# Patient Record
Sex: FEMALE | Race: WHITE | NOT HISPANIC OR LATINO | Employment: FULL TIME | URBAN - METROPOLITAN AREA
[De-identification: names, ages, dates, MRNs, and addresses within clinical notes are randomized per-mention and may not be internally consistent; named-entity substitution may affect disease eponyms.]

---

## 2018-10-02 ENCOUNTER — HOSPITAL ENCOUNTER (EMERGENCY)
Facility: HOSPITAL | Age: 28
Discharge: HOME | End: 2018-10-02
Attending: EMERGENCY MEDICINE
Payer: MEDICAID

## 2018-10-02 VITALS — DIASTOLIC BLOOD PRESSURE: 53 MMHG | OXYGEN SATURATION: 99 % | HEART RATE: 68 BPM | SYSTOLIC BLOOD PRESSURE: 96 MMHG

## 2018-10-02 DIAGNOSIS — R56.9 SEIZURE: ICD-10-CM

## 2018-10-02 LAB
ALBUMIN SERPL BCP-MCNC: 4.4 G/DL
ALP SERPL-CCNC: 135 U/L
ALT SERPL W/O P-5'-P-CCNC: 274 U/L
ANION GAP SERPL CALC-SCNC: 23 MMOL/L
ANION GAP SERPL CALC-SCNC: 8 MMOL/L
AST SERPL-CCNC: 67 U/L
BASOPHILS # BLD AUTO: 0.1 K/UL
BASOPHILS NFR BLD: 0.9 %
BILIRUB SERPL-MCNC: 0.3 MG/DL
BUN SERPL-MCNC: 9 MG/DL
BUN SERPL-MCNC: 9 MG/DL
CALCIUM SERPL-MCNC: 8.1 MG/DL
CALCIUM SERPL-MCNC: 9.4 MG/DL
CHLORIDE SERPL-SCNC: 110 MMOL/L
CHLORIDE SERPL-SCNC: 116 MMOL/L
CO2 SERPL-SCNC: 19 MMOL/L
CO2 SERPL-SCNC: 9 MMOL/L
CREAT SERPL-MCNC: 0.8 MG/DL
CREAT SERPL-MCNC: 0.9 MG/DL
DIFFERENTIAL METHOD: ABNORMAL
EOSINOPHIL # BLD AUTO: 0.3 K/UL
EOSINOPHIL NFR BLD: 2.1 %
ERYTHROCYTE [DISTWIDTH] IN BLOOD BY AUTOMATED COUNT: 12.7 %
EST. GFR  (AFRICAN AMERICAN): >60 ML/MIN/1.73 M^2
EST. GFR  (AFRICAN AMERICAN): >60 ML/MIN/1.73 M^2
EST. GFR  (NON AFRICAN AMERICAN): >60 ML/MIN/1.73 M^2
EST. GFR  (NON AFRICAN AMERICAN): >60 ML/MIN/1.73 M^2
GLUCOSE SERPL-MCNC: 116 MG/DL
GLUCOSE SERPL-MCNC: 88 MG/DL
HCT VFR BLD AUTO: 46.3 %
HGB BLD-MCNC: 15.4 G/DL
LACTATE SERPL-SCNC: 0.8 MMOL/L
LACTATE SERPL-SCNC: 4 MMOL/L
LYMPHOCYTES # BLD AUTO: 5.5 K/UL
LYMPHOCYTES NFR BLD: 40.7 %
MCH RBC QN AUTO: 32.1 PG
MCHC RBC AUTO-ENTMCNC: 33.3 G/DL
MCV RBC AUTO: 97 FL
MONOCYTES # BLD AUTO: 0.9 K/UL
MONOCYTES NFR BLD: 6.9 %
NEUTROPHILS # BLD AUTO: 6.7 K/UL
NEUTROPHILS NFR BLD: 49.4 %
PLATELET # BLD AUTO: 325 K/UL
PMV BLD AUTO: 8.5 FL
POTASSIUM SERPL-SCNC: 3.3 MMOL/L
POTASSIUM SERPL-SCNC: 4 MMOL/L
PROT SERPL-MCNC: 8.1 G/DL
RBC # BLD AUTO: 4.79 M/UL
SODIUM SERPL-SCNC: 142 MMOL/L
SODIUM SERPL-SCNC: 143 MMOL/L
WBC # BLD AUTO: 13.5 K/UL

## 2018-10-02 PROCEDURE — 80053 COMPREHEN METABOLIC PANEL: CPT

## 2018-10-02 PROCEDURE — 83605 ASSAY OF LACTIC ACID: CPT

## 2018-10-02 PROCEDURE — 80048 BASIC METABOLIC PNL TOTAL CA: CPT

## 2018-10-02 PROCEDURE — 36415 COLL VENOUS BLD VENIPUNCTURE: CPT

## 2018-10-02 PROCEDURE — 25000003 PHARM REV CODE 250: Performed by: EMERGENCY MEDICINE

## 2018-10-02 PROCEDURE — 96375 TX/PRO/DX INJ NEW DRUG ADDON: CPT

## 2018-10-02 PROCEDURE — 63600175 PHARM REV CODE 636 W HCPCS: Performed by: EMERGENCY MEDICINE

## 2018-10-02 PROCEDURE — 99284 EMERGENCY DEPT VISIT MOD MDM: CPT | Mod: 25

## 2018-10-02 PROCEDURE — 93010 ELECTROCARDIOGRAM REPORT: CPT | Mod: ,,, | Performed by: INTERNAL MEDICINE

## 2018-10-02 PROCEDURE — 85025 COMPLETE CBC W/AUTO DIFF WBC: CPT

## 2018-10-02 PROCEDURE — 96365 THER/PROPH/DIAG IV INF INIT: CPT

## 2018-10-02 PROCEDURE — 93005 ELECTROCARDIOGRAM TRACING: CPT

## 2018-10-02 PROCEDURE — 96361 HYDRATE IV INFUSION ADD-ON: CPT

## 2018-10-02 RX ORDER — LEVETIRACETAM 10 MG/ML
1000 INJECTION INTRAVASCULAR
Status: COMPLETED | OUTPATIENT
Start: 2018-10-02 | End: 2018-10-02

## 2018-10-02 RX ORDER — LORAZEPAM 2 MG/ML
1 INJECTION INTRAMUSCULAR
Status: COMPLETED | OUTPATIENT
Start: 2018-10-02 | End: 2018-10-02

## 2018-10-02 RX ORDER — LEVETIRACETAM 1000 MG/1
1500 TABLET ORAL 2 TIMES DAILY
Qty: 90 TABLET | Refills: 0 | Status: SHIPPED | OUTPATIENT
Start: 2018-10-02 | End: 2018-11-21 | Stop reason: SDUPTHER

## 2018-10-02 RX ADMIN — LORAZEPAM 1 MG: 2 INJECTION, SOLUTION INTRAMUSCULAR; INTRAVENOUS at 10:10

## 2018-10-02 RX ADMIN — SODIUM CHLORIDE 1000 ML: 0.9 INJECTION, SOLUTION INTRAVENOUS at 10:10

## 2018-10-02 RX ADMIN — SODIUM CHLORIDE 1000 ML: 0.9 INJECTION, SOLUTION INTRAVENOUS at 11:10

## 2018-10-02 RX ADMIN — LEVETIRACETAM 1000 MG: 10 INJECTION INTRAVENOUS at 10:10

## 2018-10-02 NOTE — ED NOTES
Awake/alert - no seizure activity noted this visit (initially post-ictal). Talking on cellular phone

## 2018-10-02 NOTE — ED PROVIDER NOTES
Encounter Date: 10/2/2018    SCRIBE #1 NOTE: IJanay, am scribing for, and in the presence of, Dr. Jensen.       History     Chief Complaint   Patient presents with    Seizures       Time seen by provider: 10:04 AM on 10/02/2018    Kiki Molina is a 28 y.o. female with epilepsy on Keppra 750 mg BID who presents to the ED via EMS with an onset of a witnessed seizure. Per EMS, the patient had a tonic clonic seizure en route to the ER and bit her tongue. The patient states that she ran out of her Keppra yesterday and reports drinking alcohol last night. She denies any other symptoms at this time. No SHx noted. Percocet and Lamictal drug allergies noted.      The history is provided by the patient and a friend. History limited by: Postictal.     Review of patient's allergies indicates:   Allergen Reactions    Percocet [oxycodone-acetaminophen] Rash    Lamictal [lamotrigine] Rash     Past Medical History:   Diagnosis Date    Seizures      History reviewed. No pertinent surgical history.  History reviewed. No pertinent family history.  Social History     Tobacco Use    Smoking status: Never Smoker   Substance Use Topics    Alcohol use: No    Drug use: No     Review of Systems   Unable to perform ROS: Other (Postictal following seizure)   HENT:        Positive for tongue abrasion.    Neurological: Positive for seizures.     Physical Exam     Initial Vitals [10/02/18 1102]   BP Pulse Resp Temp SpO2   (!) 96/54 102 -- -- 99 %      MAP       --         Physical Exam    Nursing note and vitals reviewed.  Constitutional: She appears well-developed and well-nourished. She is not diaphoretic. No distress.   Postictal.    HENT:   Head: Normocephalic and atraumatic.   Mouth/Throat: Lacerations present.   Tongue abrasion.    Eyes: EOM are normal.   Neck: Normal range of motion. Neck supple.   Cardiovascular: Regular rhythm and normal heart sounds. Tachycardia present.  Exam reveals no gallop and no friction rub.     No murmur heard.  Pulmonary/Chest: Breath sounds normal. No respiratory distress. She has no wheezes. She has no rhonchi. She has no rales.   Abdominal: Soft. She exhibits no distension. There is no tenderness. There is no rebound.   Musculoskeletal: Normal range of motion.   Neurological: She is alert and oriented to person, place, and time. She displays seizure activity.   Skin: Skin is warm and dry.   Psychiatric: She has a normal mood and affect. Her behavior is normal. Judgment and thought content normal.       ED Course   Procedures  Labs Reviewed   COMPREHENSIVE METABOLIC PANEL - Abnormal; Notable for the following components:       Result Value    Potassium 3.3 (*)     CO2 9 (*)     Glucose 116 (*)     AST 67 (*)      (*)     Anion Gap 23 (*)     All other components within normal limits    Narrative:     CO2 critical result(s) called and verbal readback obtained from Oscar Mckeon, 10/02/2018 10:53   CBC W/ AUTO DIFFERENTIAL - Abnormal; Notable for the following components:    WBC 13.50 (*)     MCH 32.1 (*)     MPV 8.5 (*)     Lymph # 5.5 (*)     All other components within normal limits   LACTIC ACID, PLASMA - Abnormal; Notable for the following components:    Lactate (Lactic Acid) 4.0 (*)     All other components within normal limits    Narrative:     Lactic acid critical result(s) called and verbal readback obtained   from Steff Kim, 10/02/2018 12:06   BASIC METABOLIC PANEL - Abnormal; Notable for the following components:    Chloride 116 (*)     CO2 19 (*)     Calcium 8.1 (*)     All other components within normal limits   LACTIC ACID, PLASMA   POCT URINE PREGNANCY        ECG Results          EKG 12-lead (Preliminary result)  Result time 10/02/18 10:24:29    ED Interpretation by Alejandro Jensen MD (10/02/18 10:24:29)    Sinus tachycardia 129 beats per minute                            Imaging Results          X-Ray Chest AP Portable (Final result)  Result time 10/02/18 10:30:58    Final  result by Harriet Jaeger MD (10/02/18 10:30:58)                 Impression:      No acute cardiopulmonary disease      Electronically signed by: Harriet Jaeger MD  Date:    10/02/2018  Time:    10:30             Narrative:    EXAMINATION:  XR CHEST AP PORTABLE    CLINICAL HISTORY:  Seizure;    TECHNIQUE:  Single frontal view of the chest was performed.    COMPARISON:  None    FINDINGS:  Heart is not enlarged.  Lungs are clear of infiltrate.  Multiple lines and structures project over the chest including nipple rings and anterior chest wall cardiac monitor leads.  No pleural effusion                                 Medical Decision Making:   History:   Old Medical Records: I decided to obtain old medical records.  Clinical Tests:   Lab Tests: Reviewed and Ordered  Radiological Study: Ordered and Reviewed  Medical Tests: Ordered and Reviewed            Scribe Attestation:   Scribe #1: I performed the above scribed service and the documentation accurately describes the services I performed. I attest to the accuracy of the note.    I, Dr. Jensen, personally performed the services described in this documentation. All medical record entries made by the scribe were at my direction and in my presence.  I have reviewed the chart and agree that the record reflects my personal performance and is accurate and complete.3:32 PM 10/02/2018            ED Course as of Oct 02 1531   Tue Oct 02, 2018   1034 X-Ray Chest AP Portable [EF]   1054 CO2: (!!) 9 [EF]   1054 Potassium: (!) 3.3 [EF]   1054 AST: (!) 67 [EF]   1054 ALT: (!) 274 [EF]   1054 Anion Gap: (!) 23 [EF]   1101 Lactic acid added on.  Anion gap likely explained by hyperventilation secondary to seizure  [EF]   1134 Much more alert  [EF]   1222 Lactate, Justin: (!!) 4.0 [EF]   1352 28-year-old with a history of seizures presents to the emergency room after a seizure.  Witnessed postictal phase in the ER.  Patient reports heavy alcohol use last night and she has been out of  her Keppra for at least today.  No indication for head CT.  Metabolic panel shows an acidosis which has resolved fluids secondary to a seizure.  Well-appearing and ambulatory on discharge. [EF]      ED Course User Index  [EF] Alejandro Jensen MD     Clinical Impression:   The encounter diagnosis was Seizure.      Disposition:   Disposition: Discharged  Condition: Stable                        Alejandro Jensen MD  10/02/18 153

## 2018-11-21 RX ORDER — LEVETIRACETAM 1000 MG/1
1500 TABLET ORAL 2 TIMES DAILY
Qty: 90 TABLET | Refills: 0 | Status: SHIPPED | OUTPATIENT
Start: 2018-11-21 | End: 2018-11-27 | Stop reason: SDUPTHER

## 2018-11-27 ENCOUNTER — OFFICE VISIT (OUTPATIENT)
Dept: FAMILY MEDICINE | Facility: CLINIC | Age: 28
End: 2018-11-27
Payer: MEDICAID

## 2018-11-27 VITALS
HEART RATE: 110 BPM | DIASTOLIC BLOOD PRESSURE: 62 MMHG | RESPIRATION RATE: 16 BRPM | OXYGEN SATURATION: 97 % | HEIGHT: 60 IN | BODY MASS INDEX: 30.46 KG/M2 | SYSTOLIC BLOOD PRESSURE: 114 MMHG | WEIGHT: 155.13 LBS | TEMPERATURE: 98 F

## 2018-11-27 DIAGNOSIS — M54.2 NECK PAIN: Primary | ICD-10-CM

## 2018-11-27 DIAGNOSIS — G40.909 SEIZURE DISORDER: ICD-10-CM

## 2018-11-27 DIAGNOSIS — M54.6 BILATERAL THORACIC BACK PAIN, UNSPECIFIED CHRONICITY: ICD-10-CM

## 2018-11-27 PROCEDURE — 99214 OFFICE O/P EST MOD 30 MIN: CPT | Mod: ,,, | Performed by: FAMILY MEDICINE

## 2018-11-27 RX ORDER — VENLAFAXINE 100 MG/1
75 TABLET ORAL DAILY
COMMUNITY
End: 2018-11-27

## 2018-11-27 RX ORDER — CIPROFLOXACIN 500 MG/1
1 TABLET ORAL 2 TIMES DAILY
Refills: 0 | COMMUNITY
Start: 2018-11-24 | End: 2019-02-11

## 2018-11-27 RX ORDER — ESCITALOPRAM OXALATE 20 MG/1
20 TABLET ORAL DAILY
COMMUNITY
End: 2020-01-16

## 2018-11-27 RX ORDER — TRAMADOL HYDROCHLORIDE 50 MG/1
50 TABLET ORAL EVERY 6 HOURS
Qty: 30 TABLET | Refills: 0 | Status: SHIPPED | OUTPATIENT
Start: 2018-11-27 | End: 2019-02-11

## 2018-11-27 RX ORDER — LEVETIRACETAM 1000 MG/1
1500 TABLET ORAL 2 TIMES DAILY
Qty: 90 TABLET | Refills: 0 | Status: SHIPPED | OUTPATIENT
Start: 2018-11-27 | End: 2018-12-18 | Stop reason: SDUPTHER

## 2018-11-27 RX ORDER — METHOCARBAMOL 500 MG/1
500 TABLET, FILM COATED ORAL NIGHTLY
Qty: 30 TABLET | Refills: 0 | Status: SHIPPED | OUTPATIENT
Start: 2018-11-27 | End: 2018-12-07
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
## 2018-12-18 DIAGNOSIS — G40.909 SEIZURE DISORDER: ICD-10-CM

## 2018-12-18 RX ORDER — LEVETIRACETAM 1000 MG/1
1500 TABLET ORAL 2 TIMES DAILY
Qty: 90 TABLET | Refills: 1 | Status: SHIPPED | OUTPATIENT
Start: 2018-12-18 | End: 2019-03-03 | Stop reason: SDUPTHER

## 2019-02-11 ENCOUNTER — CLINICAL SUPPORT (OUTPATIENT)
Dept: URGENT CARE | Facility: CLINIC | Age: 29
End: 2019-02-11
Payer: MEDICAID

## 2019-02-11 VITALS
HEIGHT: 60 IN | DIASTOLIC BLOOD PRESSURE: 73 MMHG | TEMPERATURE: 98 F | WEIGHT: 153 LBS | BODY MASS INDEX: 30.04 KG/M2 | SYSTOLIC BLOOD PRESSURE: 112 MMHG | HEART RATE: 58 BPM

## 2019-02-11 DIAGNOSIS — N39.0 ACUTE UTI: Primary | ICD-10-CM

## 2019-02-11 LAB
BILIRUB UR QL STRIP: NEGATIVE
GLUCOSE UR QL STRIP: NEGATIVE
KETONES UR QL STRIP: NEGATIVE
LEUKOCYTE ESTERASE UR QL STRIP: NEGATIVE
PH, POC UA: 5.5
POC BLOOD, URINE: NEGATIVE
POC NITRATES, URINE: NEGATIVE
PROT UR QL STRIP: NEGATIVE
SP GR UR STRIP: 1.02 (ref 1–1.03)
UROBILINOGEN UR STRIP-ACNC: NORMAL (ref 0.1–1.1)

## 2019-02-11 PROCEDURE — 81003 POCT URINALYSIS, DIPSTICK, AUTOMATED, W/O SCOPE: ICD-10-PCS | Mod: QW,S$GLB,, | Performed by: NURSE PRACTITIONER

## 2019-02-11 PROCEDURE — 81003 URINALYSIS AUTO W/O SCOPE: CPT | Mod: QW,S$GLB,, | Performed by: NURSE PRACTITIONER

## 2019-02-11 PROCEDURE — 99204 OFFICE O/P NEW MOD 45 MIN: CPT | Mod: 25,S$GLB,, | Performed by: NURSE PRACTITIONER

## 2019-02-11 PROCEDURE — 99204 PR OFFICE/OUTPT VISIT, NEW, LEVL IV, 45-59 MIN: ICD-10-PCS | Mod: 25,S$GLB,, | Performed by: NURSE PRACTITIONER

## 2019-02-11 RX ORDER — FLUCONAZOLE 150 MG/1
150 TABLET ORAL DAILY
Qty: 3 TABLET | Refills: 0 | Status: SHIPPED | OUTPATIENT
Start: 2019-02-11 | End: 2019-02-12

## 2019-02-11 RX ORDER — CEPHALEXIN 500 MG/1
500 CAPSULE ORAL EVERY 12 HOURS
Qty: 20 CAPSULE | Refills: 0 | Status: SHIPPED | OUTPATIENT
Start: 2019-02-11 | End: 2019-02-21

## 2019-02-11 NOTE — PROGRESS NOTES
Subjective:       Patient ID: Kiki Molina is a 28 y.o. female.    Vitals:  height is 5' (1.524 m) and weight is 69.4 kg (153 lb). Her oral temperature is 97.5 °F (36.4 °C). Her blood pressure is 112/73 and her pulse is 58 (abnormal).     Chief Complaint: Urinary Tract Infection    Pt presents with lower abd discomfort with assoc urinary urgency, frequency and lower back discomfort x 2 days. Pt denies flank pain, fever, chills, n/v.      Urinary Tract Infection    This is a new problem. The current episode started in the past 7 days. The problem has been unchanged. The pain is at a severity of 6/10. The pain is mild. There has been no fever. Associated symptoms include a discharge, frequency and urgency. Pertinent negatives include no chills, hematuria, nausea, vomiting or rash. She has tried increased fluids for the symptoms. The treatment provided no relief.       Constitution: Negative for chills and fever.   Neck: Negative for painful lymph nodes.   Gastrointestinal: Negative for abdominal pain, nausea and vomiting.   Genitourinary: Positive for frequency and urgency. Negative for dysuria, urine decreased, hematuria, history of kidney stones, painful menstruation, irregular menstruation, missed menses, heavy menstrual bleeding, ovarian cysts, genital trauma, vaginal pain, vaginal discharge, vaginal bleeding, vaginal odor, painful intercourse, genital sore, painful ejaculation and pelvic pain.   Musculoskeletal: Negative for back pain.   Skin: Negative for rash and lesion.   Hematologic/Lymphatic: Negative for swollen lymph nodes.       Objective:      Physical Exam   Constitutional: She is oriented to person, place, and time. She appears well-developed and well-nourished.   HENT:   Head: Normocephalic and atraumatic.   Right Ear: External ear normal.   Left Ear: External ear normal.   Nose: Nose normal. No nasal deformity. No epistaxis.   Mouth/Throat: Oropharynx is clear and moist and mucous membranes  are normal.   Eyes: Conjunctivae and lids are normal.   Neck: Trachea normal, normal range of motion and phonation normal. Neck supple.   Cardiovascular: Normal rate, regular rhythm, normal heart sounds and normal pulses.   Pulmonary/Chest: Effort normal and breath sounds normal.   Abdominal: Soft. Normal appearance and bowel sounds are normal. She exhibits no distension and no mass. There is no tenderness. There is no CVA tenderness.   Genitourinary:   Genitourinary Comments: Mild suprapubic tenderness, no CVA tenderness   Neurological: She is alert and oriented to person, place, and time.   Skin: Skin is warm, dry and intact.   Psychiatric: She has a normal mood and affect. Her speech is normal and behavior is normal. Cognition and memory are normal.   Nursing note and vitals reviewed.      Assessment:       1. Acute UTI        Plan:         Acute UTI  -     POCT Urinalysis, Dipstick, Automated, W/O Scope  -     Culture, Urine    Other orders  -     fluconazole (DIFLUCAN) 150 MG Tab; Take 1 tablet (150 mg total) by mouth once daily. Repeat in 72 hours if symptoms persist for 1 day  Dispense: 3 tablet; Refill: 0  -     cephALEXin (KEFLEX) 500 MG capsule; Take 1 capsule (500 mg total) by mouth every 12 (twelve) hours. for 10 days  Dispense: 20 capsule; Refill: 0

## 2019-02-11 NOTE — PATIENT INSTRUCTIONS
Understanding Urinary Tract Infections (UTIs)  Most UTIs are caused by bacteria, although they may also be caused by viruses or fungi. Bacteria from the bowel are the most common source of infection. The infection may start because of any of the following:  · Sexual activity. During sex, bacteria can travel from the penis, vagina, or rectum into the urethra.   · Bacteria on the skin outside the rectum may travel into the urethra. This is more common in women since the rectum and urethra are closer to each other than in men. Wiping from front to back after using the toilet and keeping the area clean can help prevent germs from getting to the urethra.  · Blockage of urine flow through the urinary tract. If urine sits too long, germs may start to grow out of control.      Parts of the urinary tract  The infection can occur in any part of the urinary tract.  · The kidneys collect and store urine.  · The ureters carry urine from the kidneys to the bladder.  · The bladder holds urine until you are ready to let it out.  · The urethra carries urine from the bladder out of the body. It is shorter in women, so bacteria can move through it more easily. The urethra is longer in men, so a UTI is less likely to reach the bladder or kidneys in men.  Date Last Reviewed: 1/1/2017  © 4447-2352 [employer]. [address] [place] [address], [place] [address]. All rights reserved. This information is not intended as a substitute for professional medical care. Always follow your healthcare professional's instructions.

## 2019-02-13 LAB
BACTERIA UR CULT: NO GROWTH
BACTERIA UR CULT: NORMAL

## 2019-03-03 DIAGNOSIS — G40.909 SEIZURE DISORDER: ICD-10-CM

## 2019-03-04 RX ORDER — LEVETIRACETAM 1000 MG/1
1500 TABLET ORAL 2 TIMES DAILY
Qty: 90 TABLET | Refills: 1 | Status: SHIPPED | OUTPATIENT
Start: 2019-03-04 | End: 2019-05-17 | Stop reason: SDUPTHER

## 2019-03-04 NOTE — TELEPHONE ENCOUNTER
I will refill her Keppra but please call, Mrs Molina and ask her if she needs a referral to a neurologist to follow her seizure disorder. I will gladly refer her to any neurologist that she knows accepts her insurance.

## 2019-04-04 ENCOUNTER — CLINICAL SUPPORT (OUTPATIENT)
Dept: URGENT CARE | Facility: CLINIC | Age: 29
End: 2019-04-04

## 2019-04-04 PROCEDURE — 80305 DRUG TEST PRSMV DIR OPT OBS: CPT | Mod: S$GLB,,, | Performed by: EMERGENCY MEDICINE

## 2019-04-04 PROCEDURE — 80305 PR DRUG SCREEN - 1: ICD-10-PCS | Mod: S$GLB,,, | Performed by: EMERGENCY MEDICINE

## 2019-04-04 PROCEDURE — 86580 PR  TB INTRADERMAL TEST: ICD-10-PCS | Mod: S$GLB,,, | Performed by: EMERGENCY MEDICINE

## 2019-04-04 PROCEDURE — 86580 TB INTRADERMAL TEST: CPT | Mod: S$GLB,,, | Performed by: EMERGENCY MEDICINE

## 2019-05-16 DIAGNOSIS — G40.909 SEIZURE DISORDER: ICD-10-CM

## 2019-05-16 RX ORDER — LEVETIRACETAM 1000 MG/1
1500 TABLET ORAL 2 TIMES DAILY
Qty: 90 TABLET | Refills: 1 | OUTPATIENT
Start: 2019-05-16 | End: 2020-05-15

## 2019-05-17 ENCOUNTER — OFFICE VISIT (OUTPATIENT)
Dept: URGENT CARE | Facility: CLINIC | Age: 29
End: 2019-05-17
Payer: MEDICAID

## 2019-05-17 VITALS
TEMPERATURE: 98 F | WEIGHT: 150.63 LBS | OXYGEN SATURATION: 97 % | SYSTOLIC BLOOD PRESSURE: 108 MMHG | HEART RATE: 70 BPM | BODY MASS INDEX: 29.57 KG/M2 | DIASTOLIC BLOOD PRESSURE: 74 MMHG | RESPIRATION RATE: 16 BRPM | HEIGHT: 60 IN

## 2019-05-17 DIAGNOSIS — Z76.0 PRESCRIPTION REFILL: ICD-10-CM

## 2019-05-17 DIAGNOSIS — G40.909 SEIZURE DISORDER: Primary | ICD-10-CM

## 2019-05-17 PROCEDURE — 99214 OFFICE O/P EST MOD 30 MIN: CPT | Mod: S$GLB,,, | Performed by: NURSE PRACTITIONER

## 2019-05-17 PROCEDURE — 99214 PR OFFICE/OUTPT VISIT, EST, LEVL IV, 30-39 MIN: ICD-10-PCS | Mod: S$GLB,,, | Performed by: NURSE PRACTITIONER

## 2019-05-17 RX ORDER — ALPRAZOLAM 0.5 MG/1
0.5 TABLET ORAL 3 TIMES DAILY
COMMUNITY
End: 2020-01-16

## 2019-05-17 RX ORDER — LEVETIRACETAM 1000 MG/1
1500 TABLET ORAL 2 TIMES DAILY
Qty: 90 TABLET | Refills: 1 | Status: SHIPPED | OUTPATIENT
Start: 2019-05-17 | End: 2019-05-31 | Stop reason: SDUPTHER

## 2019-05-17 NOTE — PATIENT INSTRUCTIONS
Discharge Instructions for Epilepsy  You have been diagnosed with epilepsy, a disorder of recurring seizures. When you have a seizure, an electrical disturbance happens in your brain. There are different kinds of seizures, and each patient may have one or many types of seizures. Here are some guidelines for you and your family.  If you have a seizure  Ask friends and family members to learn seizure management. Also, tell them to do the following if you have a seizure:  · Clear the area to prevent injury.  · Position you on a flat, carpeted surface, if possible.  · Dont try to restrain you.  · Dont put anything in your mouth.  · Turn you onto your side if you start to vomit.  · Keep track of the date and time the seizure started, how long it lasted, whether or not you lost consciousness, a description of your body movements, what provoked the seizure (if known), and any injuries you suffered. Using a watch may help keep correct time of events.    · Stay with you until you regain consciousness.  · Call 911 if the seizure is longer than 5 minutes, if there are multiple seizures, or if you do not begin to wake up after the seizure stops.    Activities  Following are some things to consider:  · Enjoy your normal activities. Most people with epilepsy lead normal lives.  · Avoid hazardous activities, such as mountain climbing or scuba diving. A seizure under these conditions could lead to a fatal accident.  · Do not swim alone or participate in other similar activities without others nearby.  · Ask your healthcare provider about any restrictions on driving or other activities.  · Check with your state department of public safety to learn whether there are any driving limitations based on your condition.  Other home care  Other considerations:  · Take your medicine exactly as directed. Skipping doses can affect the way your body handles the medicine, which could cause you to have a seizure.  · Dont drink alcohol or use  any medicine without talking with your healthcare provider first.  · Seizure medicines may interact with other medicines. Make sure all of your healthcare providers have a list of all your medicines.   · Birth control pills may not work as effectively when taking seizure medicines. Ask your healthcare provider if a change in birth control is needed.   · Wear a medical alert pendant or bracelet that alerts others to your condition, especially if you are allergic to seizure medicine.  · Join a local support group. Ask your healthcare provider for names and phone numbers.  .  When to seek medical attention  Tell your family members or friends to call 911 right away if you have:  · Seizure that lasts more than 5 minutes  · Multiple seizures in a row  · No recovery of consciousness after the seizure stops  Otherwise, have them call your healthcare provider right away if you have:  · Seizures that are getting longer and worse  · Seizures that are different from those youve had in the past  · Seizures strong enough to cause injury  · Skin rash  · Fever of 100.4°F (38°C) or higher, or as directed by your healthcare provider   Date Last Reviewed: 11/5/2015  © 8546-6554 [employer]. [address] [place] [address], [place] [address]. All rights reserved. This information is not intended as a substitute for professional medical care. Always follow your healthcare professional's instructions.

## 2019-05-17 NOTE — PROGRESS NOTES
Subjective:       Patient ID: Kiki Molina is a 28 y.o. female.    Vitals:  height is 5' (1.524 m) and weight is 68.3 kg (150 lb 9.6 oz). Her temperature is 98.2 °F (36.8 °C). Her blood pressure is 108/74 and her pulse is 70. Her respiration is 16 and oxygen saturation is 97%.     Chief Complaint: No chief complaint on file.    Low on seizure meds (kepra) coming in for a prescription to hold her over until she can get in with her regular doctor.       Constitution: Negative for chills, fatigue and fever.   HENT: Negative for congestion and sore throat.    Neck: Negative for painful lymph nodes.   Cardiovascular: Negative for chest pain and leg swelling.   Eyes: Negative for double vision and blurred vision.   Respiratory: Negative for cough and shortness of breath.    Gastrointestinal: Negative for nausea, vomiting and diarrhea.   Genitourinary: Negative for dysuria, frequency, urgency and history of kidney stones.   Musculoskeletal: Negative for joint pain, joint swelling, muscle cramps and muscle ache.   Skin: Negative for color change, pale, rash and bruising.   Allergic/Immunologic: Negative for seasonal allergies.   Neurological: Negative for dizziness, history of vertigo, light-headedness, passing out and headaches.   Hematologic/Lymphatic: Negative for swollen lymph nodes.   Psychiatric/Behavioral: Negative for nervous/anxious, sleep disturbance and depression. The patient is not nervous/anxious.        Objective:      Physical Exam   Constitutional: She is oriented to person, place, and time. She appears well-developed and well-nourished. She is cooperative.  Non-toxic appearance. She does not appear ill. No distress.   HENT:   Head: Normocephalic and atraumatic.   Right Ear: Hearing, tympanic membrane, external ear and ear canal normal.   Left Ear: Hearing, tympanic membrane, external ear and ear canal normal.   Nose: Nose normal. No mucosal edema, rhinorrhea or nasal deformity. No epistaxis.  Right sinus exhibits no maxillary sinus tenderness and no frontal sinus tenderness. Left sinus exhibits no maxillary sinus tenderness and no frontal sinus tenderness.   Mouth/Throat: Uvula is midline, oropharynx is clear and moist and mucous membranes are normal. No trismus in the jaw. Normal dentition. No uvula swelling. No posterior oropharyngeal erythema.   Eyes: Conjunctivae and lids are normal. Right eye exhibits no discharge. Left eye exhibits no discharge. No scleral icterus.   Sclera clear bilat   Neck: Trachea normal, normal range of motion, full passive range of motion without pain and phonation normal. Neck supple.   Cardiovascular: Normal rate, regular rhythm, normal heart sounds, intact distal pulses and normal pulses.   Pulmonary/Chest: Effort normal and breath sounds normal. No respiratory distress.   Abdominal: Soft. Normal appearance and bowel sounds are normal. She exhibits no distension, no pulsatile midline mass and no mass. There is no tenderness.   Musculoskeletal: Normal range of motion. She exhibits no edema or deformity.   Neurological: She is alert and oriented to person, place, and time. She exhibits normal muscle tone. Coordination normal.   Skin: Skin is warm, dry and intact. She is not diaphoretic. No pallor.   Psychiatric: She has a normal mood and affect. Her speech is normal and behavior is normal. Judgment and thought content normal. Cognition and memory are normal.   Nursing note and vitals reviewed.      Assessment:       1. Seizure disorder    2. Prescription refill        Plan:       Vitals stable. Denies complaints. Needs refill of Keppra. Recommend pcp follow up. Will refill 30 day supply.  Seizure disorder  -     levETIRAcetam (KEPPRA) 1000 MG tablet; Take 1.5 tablets (1,500 mg total) by mouth 2 (two) times daily.  Dispense: 90 tablet; Refill: 1    Prescription refill  -     levETIRAcetam (KEPPRA) 1000 MG tablet; Take 1.5 tablets (1,500 mg total) by mouth 2 (two) times daily.   Dispense: 90 tablet; Refill: 1

## 2019-05-17 NOTE — PROGRESS NOTES
Low on seizure meds (kepra) coming in for a prescription to hold her over until she can get in with her regular doctor.

## 2019-05-22 ENCOUNTER — CLINICAL SUPPORT (OUTPATIENT)
Dept: URGENT CARE | Facility: CLINIC | Age: 29
End: 2019-05-22
Payer: MEDICAID

## 2019-05-22 ENCOUNTER — TELEPHONE (OUTPATIENT)
Dept: FAMILY MEDICINE | Facility: CLINIC | Age: 29
End: 2019-05-22

## 2019-05-22 VITALS
OXYGEN SATURATION: 98 % | RESPIRATION RATE: 17 BRPM | DIASTOLIC BLOOD PRESSURE: 78 MMHG | WEIGHT: 150.19 LBS | HEART RATE: 78 BPM | SYSTOLIC BLOOD PRESSURE: 104 MMHG | HEIGHT: 60 IN | BODY MASS INDEX: 29.48 KG/M2 | TEMPERATURE: 98 F

## 2019-05-22 DIAGNOSIS — G43.809 OTHER MIGRAINE WITHOUT STATUS MIGRAINOSUS, NOT INTRACTABLE: Primary | ICD-10-CM

## 2019-05-22 PROCEDURE — 99214 OFFICE O/P EST MOD 30 MIN: CPT | Mod: 25,S$GLB,, | Performed by: NURSE PRACTITIONER

## 2019-05-22 PROCEDURE — 99214 PR OFFICE/OUTPT VISIT, EST, LEVL IV, 30-39 MIN: ICD-10-PCS | Mod: 25,S$GLB,, | Performed by: NURSE PRACTITIONER

## 2019-05-22 RX ORDER — BUTALBITAL, ACETAMINOPHEN AND CAFFEINE 50; 325; 40 MG/1; MG/1; MG/1
1 TABLET ORAL EVERY 6 HOURS PRN
Qty: 30 TABLET | Refills: 0 | Status: SHIPPED | OUTPATIENT
Start: 2019-05-22 | End: 2019-06-21

## 2019-05-22 RX ORDER — KETOROLAC TROMETHAMINE 30 MG/ML
30 INJECTION, SOLUTION INTRAMUSCULAR; INTRAVENOUS
Status: COMPLETED | OUTPATIENT
Start: 2019-05-22 | End: 2019-05-22

## 2019-05-22 RX ORDER — PROMETHAZINE HYDROCHLORIDE 12.5 MG/1
12.5 TABLET ORAL EVERY 4 HOURS PRN
Qty: 30 TABLET | Refills: 0 | Status: SHIPPED | OUTPATIENT
Start: 2019-05-22 | End: 2020-01-16

## 2019-05-22 RX ADMIN — KETOROLAC TROMETHAMINE 30 MG: 30 INJECTION, SOLUTION INTRAMUSCULAR; INTRAVENOUS at 12:05

## 2019-05-22 NOTE — PROGRESS NOTES
Subjective:       Patient ID: Kiki Molina is a 28 y.o. female.    Vitals:  height is 5' (1.524 m) and weight is 68.1 kg (150 lb 3.2 oz). Her temperature is 98 °F (36.7 °C). Her blood pressure is 104/78 and her pulse is 78. Her respiration is 17 and oxygen saturation is 98%.     Chief Complaint: Pain and Migraine    Pt presents with migraine headache x 3 days. Pt describes headache as frontal location, throbbing quality, assoc with photophobia and int nausea. Pt denies blurred vision, focal neuro deficits or weakness. Pt reports h/o chronic migraines and is out of her fioricet. Pt also has chronic fibromyalgia and states her chronic pain is flared up secondary to her migraine.    Pain   This is a new problem. The current episode started 1 to 4 weeks ago. The problem occurs constantly. The problem has been gradually worsening. Associated symptoms include headaches. Pertinent negatives include no chills, congestion, diaphoresis, fatigue, fever, myalgias, nausea, neck pain, numbness, rash, vertigo, vomiting or weakness. Associated symptoms comments: Muscle spasms  Migraine  . She has tried ice, heat, position changes and lying down (motrin, benadryl) for the symptoms. The treatment provided no relief.       Constitution: Negative for chills, sweating, fatigue and fever.   HENT: Negative for tinnitus, facial swelling, congestion and sinus pain.    Neck: Negative for neck pain and neck stiffness.   Eyes: Negative for eye pain, photophobia, vision loss, double vision and blurred vision.   Gastrointestinal: Negative for nausea and vomiting.   Genitourinary: Negative for missed menses.   Musculoskeletal: Negative for trauma and muscle ache.   Skin: Negative for rash, wound and lesion.   Neurological: Positive for headaches and history of migraines. Negative for dizziness, history of vertigo, light-headedness, passing out, facial drooping, speech difficulty, coordination disturbances, loss of balance,  disorientation, altered mental status, loss of consciousness, numbness, tingling, seizures and tremors.   Psychiatric/Behavioral: Negative for altered mental status, disorientation, confusion, nervous/anxious, sleep disturbance and depression. The patient is not nervous/anxious.        Objective:      Physical Exam   Constitutional: She is oriented to person, place, and time. She appears well-developed and well-nourished. She is cooperative.  Non-toxic appearance. She does not appear ill. No distress.   HENT:   Head: Normocephalic and atraumatic.   Right Ear: Hearing, tympanic membrane, external ear and ear canal normal.   Left Ear: Hearing, tympanic membrane, external ear and ear canal normal.   Nose: Nose normal. No mucosal edema, rhinorrhea or nasal deformity. No epistaxis. Right sinus exhibits no maxillary sinus tenderness and no frontal sinus tenderness. Left sinus exhibits no maxillary sinus tenderness and no frontal sinus tenderness.   Mouth/Throat: Uvula is midline, oropharynx is clear and moist and mucous membranes are normal. No trismus in the jaw. Normal dentition. No uvula swelling. No posterior oropharyngeal erythema.   No temporal TTP   Eyes: Pupils are equal, round, and reactive to light. Conjunctivae, EOM and lids are normal. No scleral icterus.   Sclera clear bilat   Neck: Trachea normal, normal range of motion, full passive range of motion without pain and phonation normal. Neck supple. No neck rigidity.   Cardiovascular: Normal rate, regular rhythm, normal heart sounds, intact distal pulses and normal pulses.   Pulmonary/Chest: Effort normal and breath sounds normal. No respiratory distress.   Abdominal: Soft. Normal appearance and bowel sounds are normal. She exhibits no distension. There is no tenderness.   Musculoskeletal: Normal range of motion. She exhibits no edema or deformity.   Neurological: She is alert and oriented to person, place, and time. She displays normal reflexes. No cranial  nerve deficit or sensory deficit. She exhibits normal muscle tone. Coordination normal.   Skin: Skin is warm, dry and intact. She is not diaphoretic. No pallor.   Psychiatric: She has a normal mood and affect. Her speech is normal and behavior is normal. Judgment and thought content normal. Cognition and memory are normal.   Nursing note and vitals reviewed.      Assessment:       1. Other migraine without status migrainosus, not intractable        Plan:         Other migraine without status migrainosus, not intractable    Other orders  -     ketorolac injection 30 mg  -     butalbital-acetaminophen-caffeine -40 mg (FIORICET, ESGIC) -40 mg per tablet; Take 1 tablet by mouth every 6 (six) hours as needed for Pain.  Dispense: 30 tablet; Refill: 0  -     promethazine (PHENERGAN) 12.5 MG Tab; Take 1 tablet (12.5 mg total) by mouth every 4 (four) hours as needed.  Dispense: 30 tablet; Refill: 0

## 2019-05-22 NOTE — LETTER
May 22, 2019      [facility]  [address]  [facility] [address]  Phone: [phone]       Patient: iKki Molina   YOB: 1990  Date of Visit: 05/22/2019    To Whom It May Concern:    Chester Molina  was at [facility] on 05/22/2019. She may return to work/school on 05/24/2019with no restrictions. If you have any questions or concerns, or if I can be of further assistance, please do not hesitate to contact me.    Sincerely,    Gail Moore MA

## 2019-05-22 NOTE — PATIENT INSTRUCTIONS

## 2019-05-23 ENCOUNTER — TELEPHONE (OUTPATIENT)
Dept: FAMILY MEDICINE | Facility: CLINIC | Age: 29
End: 2019-05-23

## 2019-05-23 DIAGNOSIS — Z00.00 PREVENTATIVE HEALTH CARE: ICD-10-CM

## 2019-05-23 DIAGNOSIS — G40.909 SEIZURE DISORDER: ICD-10-CM

## 2019-05-23 DIAGNOSIS — I10 ESSENTIAL HYPERTENSION: Primary | ICD-10-CM

## 2019-05-23 NOTE — TELEPHONE ENCOUNTER
Pt requesting labs to [facility]. Pt has h/o of seizures, anxiety. Had abnormal labs in Oct. BMP,lactic acid, CBC and CMP. 6 months ago

## 2019-05-29 ENCOUNTER — OCCUPATIONAL HEALTH (OUTPATIENT)
Dept: URGENT CARE | Facility: CLINIC | Age: 29
End: 2019-05-29

## 2019-05-29 PROCEDURE — 80305 DRUG TEST PRSMV DIR OPT OBS: CPT | Mod: S$GLB,,, | Performed by: EMERGENCY MEDICINE

## 2019-05-29 PROCEDURE — 80305 PR NON-DOT DRUG SCREENS: ICD-10-PCS | Mod: S$GLB,,, | Performed by: EMERGENCY MEDICINE

## 2019-05-31 ENCOUNTER — OFFICE VISIT (OUTPATIENT)
Dept: FAMILY MEDICINE | Facility: CLINIC | Age: 29
End: 2019-05-31
Payer: MEDICAID

## 2019-05-31 VITALS
DIASTOLIC BLOOD PRESSURE: 66 MMHG | OXYGEN SATURATION: 98 % | WEIGHT: 151.13 LBS | HEIGHT: 60 IN | SYSTOLIC BLOOD PRESSURE: 110 MMHG | HEART RATE: 97 BPM | BODY MASS INDEX: 29.67 KG/M2

## 2019-05-31 DIAGNOSIS — Z00.00 HEALTHCARE MAINTENANCE: ICD-10-CM

## 2019-05-31 DIAGNOSIS — Z23 NEED FOR HPV VACCINATION: ICD-10-CM

## 2019-05-31 DIAGNOSIS — G40.909 SEIZURE DISORDER: Primary | ICD-10-CM

## 2019-05-31 DIAGNOSIS — M79.10 MYALGIA: ICD-10-CM

## 2019-05-31 DIAGNOSIS — Z23 NEED FOR TDAP VACCINATION: ICD-10-CM

## 2019-05-31 DIAGNOSIS — Z13.21 ENCOUNTER FOR VITAMIN DEFICIENCY SCREENING: ICD-10-CM

## 2019-05-31 PROCEDURE — 90472 IMMUNIZATION ADMIN EACH ADD: CPT | Mod: ,,, | Performed by: NURSE PRACTITIONER

## 2019-05-31 PROCEDURE — 90715 TDAP VACCINE GREATER THAN OR EQUAL TO 7YO IM: ICD-10-PCS | Mod: ,,, | Performed by: NURSE PRACTITIONER

## 2019-05-31 PROCEDURE — 90715 TDAP VACCINE 7 YRS/> IM: CPT | Mod: ,,, | Performed by: NURSE PRACTITIONER

## 2019-05-31 PROCEDURE — 90471 IMMUNIZATION ADMIN: CPT | Mod: ,,, | Performed by: NURSE PRACTITIONER

## 2019-05-31 PROCEDURE — 90651 9VHPV VACCINE 2/3 DOSE IM: CPT | Mod: ,,, | Performed by: NURSE PRACTITIONER

## 2019-05-31 PROCEDURE — 99213 OFFICE O/P EST LOW 20 MIN: CPT | Mod: 25,,, | Performed by: NURSE PRACTITIONER

## 2019-05-31 PROCEDURE — 90651 HPV VACCINE 9-VALENT 3 DOSE IM: ICD-10-PCS | Mod: ,,, | Performed by: NURSE PRACTITIONER

## 2019-05-31 PROCEDURE — 90471 HPV VACCINE 9-VALENT 3 DOSE IM: ICD-10-PCS | Mod: ,,, | Performed by: NURSE PRACTITIONER

## 2019-05-31 PROCEDURE — 90472 TDAP VACCINE GREATER THAN OR EQUAL TO 7YO IM: ICD-10-PCS | Mod: ,,, | Performed by: NURSE PRACTITIONER

## 2019-05-31 PROCEDURE — 99213 PR OFFICE/OUTPT VISIT, EST, LEVL III, 20-29 MIN: ICD-10-PCS | Mod: 25,,, | Performed by: NURSE PRACTITIONER

## 2019-05-31 RX ORDER — GABAPENTIN 300 MG/1
300 CAPSULE ORAL NIGHTLY
Qty: 30 CAPSULE | Refills: 2 | Status: SHIPPED | OUTPATIENT
Start: 2019-05-31 | End: 2020-01-16

## 2019-05-31 RX ORDER — LEVETIRACETAM 1000 MG/1
1500 TABLET ORAL 2 TIMES DAILY
Qty: 120 TABLET | Refills: 1 | Status: SHIPPED | OUTPATIENT
Start: 2019-05-31 | End: 2019-09-27 | Stop reason: SDUPTHER

## 2019-05-31 NOTE — PROGRESS NOTES
SUBJECTIVE:      Patient ID: Kiki Molina is a 28 y.o. female.    Chief Complaint: Fibromyalgia (Dx by Dr. Duffy) and Seizures (Insurance changed, hasn't seen Neurologist,. Dr. Phillip has refilled in the past.)    Kiki is here for refills for Keppra.  She was seeing a neurologist in [place] but her insurance has changed and she needs refills. She reports her last seizure was in 2018.  She also has history of fibromyalgia and reports her pain is worsening.  She has not been on any medication to treat it recently.  Fibromyalgia   This is a chronic problem. The current episode started more than 1 year ago. The problem occurs constantly. The problem has been gradually worsening. Associated symptoms include arthralgias, fatigue, headaches, joint swelling, myalgias and neck pain. Pertinent negatives include no abdominal pain, anorexia, change in bowel habit, chest pain, chills, congestion, coughing, diaphoresis, fever, nausea, numbness, rash, sore throat, swollen glands, urinary symptoms, vertigo, visual change, vomiting or weakness.   Seizures    This is a chronic problem. Associated symptoms include headaches. Pertinent negatives include no sore throat, no chest pain, no cough, no nausea and no vomiting. Characteristics include rhythmic jerking. Characteristics do not include apnea.       Past Surgical History:   Procedure Laterality Date     SECTION      FOOT SURGERY Right     2    LAPAROTOMY, EXPLORATORY      OOPHORECTOMY       Family History   Problem Relation Age of Onset    No Known Problems Mother     Seizures Father       Social History     Socioeconomic History    Marital status: Single     Spouse name: Not on file    Number of children: Not on file    Years of education: Not on file    Highest education level: Not on file   Occupational History    Not on file   Social Needs    Financial resource strain: Not on file    Food insecurity:     Worry: Not on  file     Inability: Not on file    Transportation needs:     Medical: Not on file     Non-medical: Not on file   Tobacco Use    Smoking status: Never Smoker    Smokeless tobacco: Never Used   Substance and Sexual Activity    Alcohol use: No    Drug use: No    Sexual activity: Not on file   Lifestyle    Physical activity:     Days per week: Not on file     Minutes per session: Not on file    Stress: Not on file   Relationships    Social connections:     Talks on phone: Not on file     Gets together: Not on file     Attends Gnosticism service: Not on file     Active member of club or organization: Not on file     Attends meetings of clubs or organizations: Not on file     Relationship status: Not on file   Other Topics Concern    Not on file   Social History Narrative    Not on file     Current Outpatient Medications   Medication Sig Dispense Refill    ALPRAZolam (XANAX) 0.5 MG tablet Take 0.5 mg by mouth 3 (three) times daily.      butalbital-acetaminophen-caffeine -40 mg (FIORICET, ESGIC) -40 mg per tablet Take 1 tablet by mouth every 6 (six) hours as needed for Pain. 30 tablet 0    escitalopram oxalate (LEXAPRO) 20 MG tablet Take 20 mg by mouth Daily.      levETIRAcetam (KEPPRA) 1000 MG tablet Take 1.5 tablets (1,500 mg total) by mouth 2 (two) times daily. 120 tablet 1    promethazine (PHENERGAN) 12.5 MG Tab Take 1 tablet (12.5 mg total) by mouth every 4 (four) hours as needed. 30 tablet 0    gabapentin (NEURONTIN) 300 MG capsule Take 1 capsule (300 mg total) by mouth every evening. 30 capsule 2     No current facility-administered medications for this visit.      Review of patient's allergies indicates:   Allergen Reactions    Percocet [oxycodone-acetaminophen] Rash    Lamictal [lamotrigine] Rash      Past Medical History:   Diagnosis Date    Bursitis, trochanteric     Diffuse arthralgia     Fibromyalgia     Heavy tobacco smoker     Pain in lower back     Pinched nerve in neck      Plantar fasciitis, bilateral     PTSD (post-traumatic stress disorder)     PTSD (post-traumatic stress disorder)     PTSD (post-traumatic stress disorder)     Seizures      Past Surgical History:   Procedure Laterality Date     SECTION  2011    FOOT SURGERY Right     2    LAPAROTOMY, EXPLORATORY      OOPHORECTOMY         Review of Systems   Constitutional: Positive for fatigue. Negative for chills, diaphoresis and fever.   HENT: Negative for congestion, postnasal drip, rhinorrhea and sore throat.    Respiratory: Negative for apnea, cough and wheezing.    Cardiovascular: Negative for chest pain.   Gastrointestinal: Negative for abdominal pain, anorexia, change in bowel habit, nausea and vomiting.   Genitourinary: Negative for difficulty urinating, flank pain, menstrual problem and vaginal discharge.   Musculoskeletal: Positive for arthralgias, joint swelling, myalgias and neck pain.   Skin: Negative for rash.   Neurological: Positive for seizures and headaches. Negative for vertigo, weakness and numbness.   Hematological: Negative for adenopathy. Does not bruise/bleed easily.   Psychiatric/Behavioral: Positive for dysphoric mood and sleep disturbance. Negative for agitation and suicidal ideas. The patient is nervous/anxious.       OBJECTIVE:      Vitals:    19 0931   BP: 110/66   Pulse: 97   SpO2: 98%   Weight: 68.5 kg (151 lb 1.6 oz)   Height: 5' (1.524 m)     Physical Exam   Constitutional: She is oriented to person, place, and time. She appears well-developed and well-nourished. No distress.   HENT:   Head: Normocephalic and atraumatic.   Right Ear: Tympanic membrane, external ear and ear canal normal.   Left Ear: Tympanic membrane, external ear and ear canal normal.   Nose: Nose normal. No mucosal edema or rhinorrhea.   Mouth/Throat: Uvula is midline and oropharynx is clear and moist. No oropharyngeal exudate, posterior oropharyngeal edema or posterior oropharyngeal erythema.   Eyes:  Pupils are equal, round, and reactive to light. Conjunctivae, EOM and lids are normal. Right eye exhibits no discharge. Left eye exhibits no discharge. No scleral icterus.   Neck: Normal range of motion. Neck supple. Carotid bruit is not present. No tracheal deviation present. No thyromegaly present.   Cardiovascular: Normal rate, regular rhythm, normal heart sounds and intact distal pulses. Exam reveals no gallop and no friction rub.   No murmur heard.  Pulmonary/Chest: Effort normal and breath sounds normal. No stridor. No respiratory distress. She has no wheezes. She has no rales.   Abdominal: Soft. Bowel sounds are normal. She exhibits no distension and no mass. There is no hepatosplenomegaly. There is no tenderness. There is no rigidity, no rebound, no guarding and no CVA tenderness.   Musculoskeletal: Normal range of motion. She exhibits no edema.   Lymphadenopathy:     She has no cervical adenopathy.   Neurological: She is alert and oriented to person, place, and time.   Skin: Skin is warm, dry and intact. Capillary refill takes less than 2 seconds. She is not diaphoretic.   Psychiatric: She has a normal mood and affect. Her behavior is normal. Judgment and thought content normal. She expresses no suicidal plans.   Vitals reviewed.     Assessment:       1. Seizure disorder    2. Myalgia    3. Need for Tdap vaccination    4. Need for HPV vaccination    5. Healthcare maintenance    6. Encounter for vitamin deficiency screening        Plan:       Seizure disorder  -     Ambulatory referral to Neurology  -     levETIRAcetam (KEPPRA) 1000 MG tablet; Take 1.5 tablets (1,500 mg total) by mouth 2 (two) times daily.  Dispense: 120 tablet; Refill: 1    Myalgia  -     gabapentin (NEURONTIN) 300 MG capsule; Take 1 capsule (300 mg total) by mouth every evening.  Dispense: 30 capsule; Refill: 2   Pt to ask psych provider about switching anti-depressant to Cymbalta to help with pain from fibromyalgia    Need for Tdap  vaccination  -     (In Office Administered) Tdap Vaccine    Need for HPV vaccination  -     (In Office Administered) HPV Vaccine (9-Valent) (3 Dose) (IM)    Healthcare maintenance  -     CBC auto differential; Future; Expected date: 05/31/2019  -     Comprehensive metabolic panel; Future; Expected date: 05/31/2019  -     Lipid panel; Future; Expected date: 05/31/2019  -     TSH; Future; Expected date: 05/31/2019  -     Urinalysis; Future    Encounter for vitamin deficiency screening  -     Vitamin D; Future; Expected date: 05/31/2019        Follow up in about 2 months (around 7/31/2019) for second HPV-nurse visit.      5/31/2019 TERRI Francis, FNP

## 2019-05-31 NOTE — PATIENT INSTRUCTIONS
Fibromyalgia  Fibromyalgia is a chronic condition. It causes pain and tenderness in connective tissues. This causes muscle pain. Often, there are also many tender areas throughout the body. Symptoms may also include stiffness and feelings of numbness and tingling. Symptoms may be worse upon waking up. They may increase with poor sleep, heavy activity, cold or damp weather, anxiety, or stress.  People with fibromyalgia often feel tired. They may have trouble sleeping. Other symptoms include morning stiffness, headaches, and painful menstrual periods. Some people have problems with thinking clearly and changes in memory.  The cause of fibromyalgia is not known. Symptoms are similar to that of other diseases. These include rheumatoid arthritis, low thyroid, chronic fatigue syndrome, and Lyme disease. In some cases, these diseases may occur together.  Fibromyalgia is often treated with medicines. You and your healthcare provider can discuss the medicine that may work best for you. You may have to try more than one medicine or combination of medicines before you find what works for you.  Home care  · If your healthcare provider has prescribed or recommended medicines, take them as directed.  · Rest as needed. Try to get enough sleep. If you have trouble sleeping, discuss this with your healthcare provider.   · Be active. Regular exercise can help manage symptoms. Some options include walking, swimming, and biking. Strengthening exercises may also be helpful. Talk to your healthcare provider about the best ways to be active.  · Follow a healthy diet. Limit caffeine and alcohol. If you smoke, ask your healthcare provider for help to stop.  · Notice how your body reacts to stress. Learn to listen to your body signals. This will help you take action before the stress becomes severe.  · Learn relaxation techniques. Also consider joining a stress reduction program or class.  · Talk to your healthcare provider about trying  complementary treatments. These include acupuncture, hypnosis, and biofeedback. Yoga and noreen chi may be helpful.  · Ask your healthcare provider about cognitive behavioral therapy (CBT). This type of counseling can help people with fibromyalgia cope better with their illness.  Follow-up care  Follow up with your healthcare provider or as advised by our staff. In many cases, fibromyalgia is best treated with a team approach.  This may involve your primary care provider, a rheumatologist, a physical therapist, and a mental health professional.   For more information: National [employer] of Arthritis and Musculoskeletal and Skin Diseases (NIAMS)  www.niams.nih.gov 144-342-8091  When to seek medical advice  Contact your healthcare provider right away if any of these occur:  · Symptoms getting worse or new symptoms developing  · You feel hopeless, helpless, or lose interest in day-to-day life  Date Last Reviewed: 3/1/2017  © 2450-6350 [employer]. [address] [place] [address]. All rights reserved. This information is not intended as a substitute for professional medical care. Always follow your healthcare professional's instructions.        Eating Heart-Healthy Food: Using the DASH Plan    Eating for your heart doesnt have to be hard or boring. You just need to know how to make healthier choices. The DASH eating plan has been developed to help you do just that. DASH stands for Dietary Approaches to Stop Hypertension. It is a plan that has been proven to be healthier for your heart and to lower your risk for high blood pressure. It can also help lower your risk for cancer, heart disease, osteoporosis, and diabetes.  Choosing from each food group  Choose foods from each of the food groups below each day. Try to get the recommended number of servings for each food group. The serving numbers are based on a diet of 2,000 calories a day. Talk to your doctor if youre unsure about your calorie needs.  Along with getting the correct servings, the DASH plan also recommends a sodium intake less than 2,300 mg per day.        Grains  Servings: 6 to 8 a day  A serving is:  · 1 slice bread  · 1 ounce dry cereal  · Half a cup cooked rice, pasta or cereal  Best choices: Whole grains and any grains high in fiber. Vegetables  Servings: 4 to 5 a day  A serving is:  · 1 cup raw leafy vegetable  · Half a cup cut-up raw or cooked vegetable  · Half a cup vegetable juice  Best choices: Fresh or frozen vegetables prepared without added salt or fat.   Fruits  Servings: 4 to 5 a day  A serving is:  · 1 medium fruit  · One-quarter cup dried fruit  · Half a cup fresh, frozen, or canned fruit  · Half a cup of 100% fruit juices  Best choices: A variety of fresh fruits of different colors. Whole fruits are a better choice than fruit juices. Low-fat or fat-free dairy  Servings: 2 to 3 a day  A serving is:  · 1 cup milk  · 1 cup yogurt  · One and a half ounces cheese  Best choices: Skim or 1% milk, low-fat or fat-free yogurt or buttermilk, and low-fat cheeses.         Lean meats, poultry, fish  Servings: 6 or fewer a day  A serving is:  · 1 ounce cooked meats, poultry, or fish  · 1 egg  Best choices: Lean poultry and fish. Trim away visible fat. Broil, grill, roast, or boil instead of frying. Remove skin from poultry before eating. Limit how much red meat you eat.  Nuts, seeds, beans  Servings: 4 to 5 a week  A serving is:  · One-third cup nuts (one and a half ounces)  · 2 tablespoons nut butter or seeds  · Half a cup cooked dry beans or legumes  Best choices: Dry roasted nuts with no salt added, lentils, kidney beans, garbanzo beans, and whole black beans.   Fats and oils  Servings: 2 to 3 a day  A serving is:  · 1 teaspoon vegetable oil  · 1 teaspoon soft margarine  · 1 tablespoon mayonnaise  · 2 tablespoons salad dressing  Best choices: Nut and vegetable oils (nontropical vegetable oils), such as olive and canola oil. Sweets  Servings:  5 a week or fewer  A serving is:  · 1 tablespoon sugar, maple syrup, or honey  · 1 tablespoon jam or jelly  · 1 half-ounce jelly beans (about 15)  · 1 cup lemonade  Best choices: Dried fruit can be a satisfying sweet. Choose low-fat sweets. And watch your serving sizes!      For more on the DASH eating plan, visit:  www.nhlbi.nih.gov/health/health-topics/topics/dash   Date Last Reviewed: 6/1/2016 © 2000-2017 [employer]. [address] [place] [address]. All rights reserved. This information is not intended as a substitute for professional medical care. Always follow your healthcare professional's instructions.        Aerobic Exercise for a Healthy Heart  Exercise is a lot more than an energy booster and a stress reliever. It also strengthens your heart muscle, lowers your blood pressure and cholesterol, and burns calories. It can also improve your resting muscle tone, and your mood.     Remember, some activity is better than none.    Choose an aerobic activity  Choose an activity that makes your heart and lungs work harder than they do when you rest or walk normally. This aerobic exercise can improve the way your heart and other muscles use oxygen. Make it fun by exercising with a friend and choosing an activity you enjoy. Here are some ideas:  · Walking  · Swimming  · Bicycling  · Stair climbing  · Dancing  · Jogging  · Gardening  Exercise regularly  If you havent been exercising regularly,  get your doctors OK first. Then start slowly.  Here are some tips:  · Begin exercising 3 times a week for 5 to 10 minutes at a time.  · When you feel comfortable, add a few minutes each session.  · Slowly build up to exercising 3 to 4 times each week. Each session should last for 40 minutes, on average, and involve moderate- to vigorous-intensity physical activity.  · If you have been given nitroglycerin, be sure to carry it when you exercise.  · If you get chest pain (angina) when youre exercising,  stop what youre doing, take your nitroglycerin, and call your doctor.  Date Last Reviewed: 6/2/2016  © 3726-2205 [employer]. [address] [place] [address], [place] [address]. All rights reserved. This information is not intended as a substitute for professional medical care. Always follow your healthcare professional's instructions.

## 2019-06-29 ENCOUNTER — HOSPITAL ENCOUNTER (EMERGENCY)
Facility: HOSPITAL | Age: 29
Discharge: HOME | End: 2019-06-29
Attending: EMERGENCY MEDICINE
Payer: MEDICAID

## 2019-06-29 ENCOUNTER — CLINICAL SUPPORT (OUTPATIENT)
Dept: URGENT CARE | Facility: CLINIC | Age: 29
End: 2019-06-29
Payer: MEDICAID

## 2019-06-29 VITALS
WEIGHT: 160.63 LBS | HEIGHT: 60 IN | OXYGEN SATURATION: 98 % | HEART RATE: 106 BPM | DIASTOLIC BLOOD PRESSURE: 78 MMHG | RESPIRATION RATE: 18 BRPM | SYSTOLIC BLOOD PRESSURE: 124 MMHG | TEMPERATURE: 99 F | BODY MASS INDEX: 31.54 KG/M2

## 2019-06-29 VITALS
RESPIRATION RATE: 16 BRPM | WEIGHT: 160 LBS | TEMPERATURE: 98 F | DIASTOLIC BLOOD PRESSURE: 80 MMHG | SYSTOLIC BLOOD PRESSURE: 118 MMHG | BODY MASS INDEX: 31.41 KG/M2 | OXYGEN SATURATION: 100 % | HEIGHT: 60 IN | HEART RATE: 87 BPM

## 2019-06-29 DIAGNOSIS — R10.9 ABDOMINAL PAIN, UNSPECIFIED ABDOMINAL LOCATION: Primary | ICD-10-CM

## 2019-06-29 DIAGNOSIS — N83.201 HEMORRHAGIC CYST OF RIGHT OVARY: Primary | ICD-10-CM

## 2019-06-29 LAB
ALBUMIN SERPL BCP-MCNC: 3.7 G/DL (ref 3.5–5.2)
ALP SERPL-CCNC: 67 U/L (ref 55–135)
ALT SERPL W/O P-5'-P-CCNC: 56 U/L (ref 10–44)
ANION GAP SERPL CALC-SCNC: 8 MMOL/L (ref 8–16)
AST SERPL-CCNC: 36 U/L (ref 10–40)
B-HCG UR QL: NEGATIVE
BASOPHILS # BLD AUTO: 0.04 K/UL (ref 0–0.2)
BASOPHILS NFR BLD: 0.5 % (ref 0–1.9)
BILIRUB SERPL-MCNC: 0.4 MG/DL (ref 0.1–1)
BILIRUB UR QL STRIP: NEGATIVE
BUN SERPL-MCNC: 10 MG/DL (ref 6–20)
CALCIUM SERPL-MCNC: 9.2 MG/DL (ref 8.7–10.5)
CHLORIDE SERPL-SCNC: 111 MMOL/L (ref 95–110)
CO2 SERPL-SCNC: 19 MMOL/L (ref 23–29)
CREAT SERPL-MCNC: 0.8 MG/DL (ref 0.5–1.4)
CTP QC/QA: YES
DIFFERENTIAL METHOD: ABNORMAL
EOSINOPHIL # BLD AUTO: 0.6 K/UL (ref 0–0.5)
EOSINOPHIL NFR BLD: 6.8 % (ref 0–8)
ERYTHROCYTE [DISTWIDTH] IN BLOOD BY AUTOMATED COUNT: 12.4 % (ref 11.5–14.5)
EST. GFR  (AFRICAN AMERICAN): >60 ML/MIN/1.73 M^2
EST. GFR  (NON AFRICAN AMERICAN): >60 ML/MIN/1.73 M^2
GLUCOSE SERPL-MCNC: 114 MG/DL (ref 70–110)
GLUCOSE UR QL STRIP: NEGATIVE
HCT VFR BLD AUTO: 42.1 % (ref 37–48.5)
HGB BLD-MCNC: 14.2 G/DL (ref 12–16)
IMM GRANULOCYTES # BLD AUTO: 0.03 K/UL (ref 0–0.04)
KETONES UR QL STRIP: NEGATIVE
LEUKOCYTE ESTERASE UR QL STRIP: NEGATIVE
LIPASE SERPL-CCNC: 30 U/L (ref 4–60)
LYMPHOCYTES # BLD AUTO: 2.1 K/UL (ref 1–4.8)
LYMPHOCYTES NFR BLD: 25.5 % (ref 18–48)
MCH RBC QN AUTO: 32.6 PG (ref 27–31)
MCHC RBC AUTO-ENTMCNC: 33.7 G/DL (ref 32–36)
MCV RBC AUTO: 97 FL (ref 82–98)
MONOCYTES # BLD AUTO: 0.6 K/UL (ref 0.3–1)
MONOCYTES NFR BLD: 7.4 % (ref 4–15)
NEUTROPHILS # BLD AUTO: 4.9 K/UL (ref 1.8–7.7)
NEUTROPHILS NFR BLD: 59.4 % (ref 38–73)
NRBC BLD-RTO: 0 /100 WBC
PH, POC UA: 5.5
PLATELET # BLD AUTO: 218 K/UL (ref 150–350)
PMV BLD AUTO: 9.7 FL (ref 9.2–12.9)
POC BLOOD, URINE: NEGATIVE
POC NITRATES, URINE: NEGATIVE
POTASSIUM SERPL-SCNC: 4.6 MMOL/L (ref 3.5–5.1)
PROT SERPL-MCNC: 6.4 G/DL (ref 6–8.4)
PROT UR QL STRIP: NEGATIVE
RBC # BLD AUTO: 4.36 M/UL (ref 4–5.4)
SODIUM SERPL-SCNC: 138 MMOL/L (ref 136–145)
SP GR UR STRIP: 1.02 (ref 1–1.03)
UROBILINOGEN UR STRIP-ACNC: NORMAL (ref 0.1–1.1)
WBC # BLD AUTO: 8.27 K/UL (ref 3.9–12.7)

## 2019-06-29 PROCEDURE — 81025 POCT URINE PREGNANCY: ICD-10-PCS | Mod: S$GLB,,, | Performed by: NURSE PRACTITIONER

## 2019-06-29 PROCEDURE — 85025 COMPLETE CBC W/AUTO DIFF WBC: CPT

## 2019-06-29 PROCEDURE — 83690 ASSAY OF LIPASE: CPT

## 2019-06-29 PROCEDURE — 99214 PR OFFICE/OUTPT VISIT, EST, LEVL IV, 30-39 MIN: ICD-10-PCS | Mod: 25,S$GLB,, | Performed by: NURSE PRACTITIONER

## 2019-06-29 PROCEDURE — 81003 POCT URINALYSIS, DIPSTICK, AUTOMATED, W/O SCOPE: ICD-10-PCS | Mod: QW,S$GLB,, | Performed by: NURSE PRACTITIONER

## 2019-06-29 PROCEDURE — 80053 COMPREHEN METABOLIC PANEL: CPT

## 2019-06-29 PROCEDURE — 36415 COLL VENOUS BLD VENIPUNCTURE: CPT

## 2019-06-29 PROCEDURE — 81025 URINE PREGNANCY TEST: CPT | Mod: S$GLB,,, | Performed by: NURSE PRACTITIONER

## 2019-06-29 PROCEDURE — 99285 EMERGENCY DEPT VISIT HI MDM: CPT

## 2019-06-29 PROCEDURE — 99214 OFFICE O/P EST MOD 30 MIN: CPT | Mod: 25,S$GLB,, | Performed by: NURSE PRACTITIONER

## 2019-06-29 PROCEDURE — 25500020 PHARM REV CODE 255: Performed by: EMERGENCY MEDICINE

## 2019-06-29 PROCEDURE — 81003 URINALYSIS AUTO W/O SCOPE: CPT | Mod: QW,S$GLB,, | Performed by: NURSE PRACTITIONER

## 2019-06-29 RX ORDER — TRAMADOL HYDROCHLORIDE 50 MG/1
50 TABLET ORAL EVERY 6 HOURS PRN
Qty: 12 TABLET | Refills: 0 | Status: SHIPPED | OUTPATIENT
Start: 2019-06-29 | End: 2020-01-16

## 2019-06-29 RX ADMIN — IOHEXOL 75 ML: 350 INJECTION, SOLUTION INTRAVENOUS at 02:06

## 2019-06-29 NOTE — ED PROVIDER NOTES
"Encounter Date: 2019    SCRIBE #1 NOTE: I, Melvin Coronado, am scribing for, and in the presence of, Dr. Leon.       History     Chief Complaint   Patient presents with    Abdominal Pain     atraumatic, indicates band-like lower abd pain that wraps around to back; exacerbated with toileting and ambulating/changeing positions     Time seen by provider: 12:57 PM on 2019      Kiki Molina is a 28 y.o. female with a PMHx of fibromyalgia, seizures, and PTSD who presents to the ED for right sided abdominal pain that started 3 days ago. The patient reports that this pain started sporadically in the right lower quadrant. Per patient, "The pain started in my right side and radiated around to my back." Patient reports that the patient is more of a sharp pain that is intermittent, but worsened this morning. Patient states that she went to a , where she was instructed to come to the ED to rule out appendicitis. The patient reports that she is having some nausea and fatigued with this abdominal pain. She denies fever, vomiting, diarrhea, rash, or any other complaint at this time. She has a PSHx of laparotomy, oophorectomy, and .    The history is provided by the patient.     Review of patient's allergies indicates:   Allergen Reactions    Percocet [oxycodone-acetaminophen] Rash    Lamictal [lamotrigine] Rash     Past Medical History:   Diagnosis Date    Bursitis, trochanteric     Diffuse arthralgia     Fibromyalgia     Heavy tobacco smoker     Pain in lower back     Pinched nerve in neck     Plantar fasciitis, bilateral     PTSD (post-traumatic stress disorder)     PTSD (post-traumatic stress disorder)     PTSD (post-traumatic stress disorder)     Seizures      Past Surgical History:   Procedure Laterality Date     SECTION  2011    FOOT SURGERY Right     2    LAPAROTOMY, EXPLORATORY      OOPHORECTOMY       Family History   Problem Relation Age of Onset    No Known Problems " Mother     Seizures Father      Social History     Tobacco Use    Smoking status: Never Smoker    Smokeless tobacco: Never Used   Substance Use Topics    Alcohol use: No    Drug use: No     Review of Systems  REVIEW OF SYSTEMS  CONSTITUTIONAL: Negative for fever.  HEENT:  Negative for sore throat.   HEART:   Negative for chest pain..  LUNG:  Negative for shortness of breath.  ABDOMEN:  Positive for lower abdominal pain, low back pain  :  No discharge, dysuria  EXTREMITIES:  No swelling  NEURO:  Negative for weakness.   SKIN:  Negative for rash.  Psych: No depression  HEME: Does not bruise/bleed easily.           Physical Exam     Initial Vitals [06/29/19 1230]   BP Pulse Resp Temp SpO2   128/75 (!) 112 18 98.4 °F (36.9 °C) 98 %      MAP       --         Physical Exam    Nursing note and vitals reviewed.  Constitutional: She appears well-developed and well-nourished.  Non-toxic appearance. No distress.   HENT:   Head: Normocephalic and atraumatic.   Eyes: EOM are normal. Pupils are equal, round, and reactive to light.   Neck: Normal range of motion. Neck supple. No neck rigidity. No JVD present.   Cardiovascular: Normal rate, regular rhythm, normal heart sounds and intact distal pulses. Exam reveals no gallop and no friction rub.    No murmur heard.  Pulmonary/Chest: Breath sounds normal. She has no wheezes. She has no rhonchi. She has no rales.   Abdominal: Soft. Bowel sounds are normal. She exhibits no distension. There is tenderness in the right lower quadrant and left lower quadrant. There is tenderness at McBurney's point (mild). There is no rebound and no guarding.   Musculoskeletal: Normal range of motion.   Right lumbar paraspinal tenderness. No spinal midline tenderness   Neurological: She is alert and oriented to person, place, and time. She has normal strength and normal reflexes. No cranial nerve deficit or sensory deficit. She exhibits normal muscle tone. Coordination normal. GCS eye subscore is  4. GCS verbal subscore is 5. GCS motor subscore is 6.   Skin: Skin is warm and dry.   Psychiatric: She has a normal mood and affect. Her speech is normal and behavior is normal. She is not actively hallucinating.         ED Course   Procedures  Labs Reviewed   COMPREHENSIVE METABOLIC PANEL - Abnormal; Notable for the following components:       Result Value    Chloride 111 (*)     CO2 19 (*)     Glucose 114 (*)     ALT 56 (*)     All other components within normal limits   CBC W/ AUTO DIFFERENTIAL - Abnormal; Notable for the following components:    Mean Corpuscular Hemoglobin 32.6 (*)     Eos # 0.6 (*)     All other components within normal limits   LIPASE          Imaging Results          US Pelvis Comp with Transvag NON-OB (xpd (Final result)  Result time 06/29/19 16:55:10    Final result by Gian Lara MD (06/29/19 16:55:10)                 Impression:      1. No right ovarian torsion.  There is a 4.6 cm complex lesion presumably reflective of a hemorrhagic cyst.  If this correlates with site of pain, consider follow-up ultrasound in 6-8 weeks (next menstrual cycle).  2. Appropriately positioned IUD.  3. Nonvisualized left ovary.      Electronically signed by: Gian Lara  Date:    06/29/2019  Time:    16:55             Narrative:    EXAMINATION:  US PELVIS COMP WITH TRANSVAG NON-OB (XPD)    CLINICAL HISTORY:  r/o right adnexal torsion;    TECHNIQUE:  Transabdominal sonography of the pelvis was performed, followed by transvaginal sonography to better evaluate the uterus and ovaries.    COMPARISON:  Same-day CT    FINDINGS:  Uterus:    Size: 9.3 x 4 x 5 cm    Masses: There is an intrauterine device.  Small volume hemorrhage or free fluid along the cervical canal.    Endometrium: Normal in this pre menopausal patient, measuring 4 mm.    Right ovary:    Size: 5.6 x 4 x 5 cm    Appearance: There is a 4.6 cm complex lesion without internal vascularity.    Vascular flow: Normal.    Left ovary:    Left  ovary is not seen and may be surgically absent necessitating correlation with surgical history.    Free Fluid:    Small volume free fluid                               CT Abdomen Pelvis With Contrast (Final result)  Result time 06/29/19 14:43:43    Final result by Gian Lara MD (06/29/19 14:43:43)                 Impression:      1. 4 cm cystic lesion in the right adnexa with internal complexity.  This enlarges the right ovary and presumably relates to a hemorrhagic cyst.  Recommend ultrasound for further characterization if there is clinical concern for torsion.  2.  Moderate degree of stool in the colon as can be seen with constipation.  3. Indeterminate left renal lesion, too high in density for a simple cyst.  Elective ultrasound could be considered to exclude a solid mass.  4. Trace right pleural fluid.      Electronically signed by: Gian Lara  Date:    06/29/2019  Time:    14:43             Narrative:    EXAMINATION:  CT ABDOMEN PELVIS WITH CONTRAST    CLINICAL HISTORY:  LLQ pain, suspect diverticulitis;RLQ pain, appendicitis suspected;    TECHNIQUE:  Low dose axial images, sagittal and coronal reformations were obtained from the lung bases to the pubic symphysis following the IV administration of 75 mL of Omnipaque 350 .  Oral contrast was not given.    COMPARISON:  None.    FINDINGS:  Lung bases are clear.  Right pleural fluid.  Normal size heart.  Decompressed gallbladder.    There is a 1.1 cm hypodense lesion parapelvic in location along the left lower pole of the kidney.  Hounsfield unit measures 40.  Remaining solid abdominal organs including liver spleen pancreas adrenal glands and right kidney are unremarkable.    There is no enteric contrast which limits bowel assessment.  Stomach is moderately distended.  Remaining bowel loops are normal in caliber.  Moderate degree of stool in the colon.  There are a few scattered colonic diverticula.    There is an intrauterine device.  Urinary  bladder is unremarkable.  There is a 4.3 cm hypodense lesion in the right adnexa with some heterogeneous density along the medial wall.  Right ovary is enlarged.  Small volume free fluid in the pelvis, low in density.  No acute osseous abnormality.                                 Medical Decision Making:   History:   Old Medical Records: I decided to obtain old medical records.  Initial Assessment:   Patient is a 28-year-old woman who presents emergency department complaining of right lower abdominal pain that is bandlike and radiates around to her back and to the left lower abdomen.  She went to urgent care and was referred to the ER to rule out acute appendicitis.  Patient has no fever leukocytosis.  She has no elevation of white blood cell count.  No evidence of urinary tract infection.  CT of the abdomen pelvis was obtained.  No evidence of acute appendicitis.  Patient does have a 4 cm lesion in the right adnexa with internal complexity.  Ultrasound was performed to rule out torsion.  No evidence of torsion patient peers to have a hemorrhagic cyst.  Also has moderate degree of stool that could be seen with constipation and indeterminate left renal lesion which patient was informed about and given copy of report for follow-up by her PCP.  Pain was treated in the ER and improved.  She is discharged in no acute distress.  Pain is likely secondary to hemorrhagic cyst.  Return precautions discussed.  Clinical Tests:   Lab Tests: Ordered and Reviewed  Radiological Study: Ordered and Reviewed            Scribe Attestation:   Scribe #1: I performed the above scribed service and the documentation accurately describes the services I performed. I attest to the accuracy of the note.     I, Edward Dumont, personally performed the services described in this documentation. All medical record entries made by the scribe were at my direction and in my presence.  I have reviewed the chart and agree that the record reflects  my personal performance and is accurate and complete. Juanito Leon MD.           Clinical Impression:       ICD-10-CM ICD-9-CM   1. Hemorrhagic cyst of right ovary N83.201 620.2         Disposition:   Disposition: Discharged  Condition: Stable                        Juanito Leon MD  06/30/19 1232

## 2019-06-29 NOTE — PROGRESS NOTES
Subjective:       Patient ID: Kiki Molina is a 28 y.o. female.    Vitals:  height is 5' (1.524 m) and weight is 72.8 kg (160 lb 9.6 oz). Her temperature is 98.7 °F (37.1 °C). Her blood pressure is 124/78 and her pulse is 106. Her respiration is 18 and oxygen saturation is 98%.     Chief Complaint: Abdominal Pain    Presents with lower abdominal pain x 3 days. Started out widespread in lower abd and back, with nausea yesterday. States that it localized to RLQ yesterday but feels sore to suprapubic and LLQ regions. Is progressively worse over the past few days, was unable to work yesterday due to the pain. Denies vomiting or diarrhea. Denies urinary complaint. Pain worse with movement and occurs at rest.     Abdominal Pain   This is a new problem. Episode onset: thursday. The onset quality is sudden. The problem occurs constantly. The pain is located in the RLQ. The quality of the pain is sharp. The abdominal pain radiates to the back. Associated symptoms include nausea. Pertinent negatives include no arthralgias, diarrhea, dysuria, fever, frequency, headaches, myalgias or vomiting. The pain is aggravated by movement. The pain is relieved by being still. Treatments tried: Goodys. The treatment provided no relief.       Constitution: Negative for chills, fatigue and fever.   HENT: Negative for congestion and sore throat.    Neck: Negative for painful lymph nodes.   Cardiovascular: Negative for chest pain and leg swelling.   Eyes: Negative for double vision and blurred vision.   Respiratory: Negative for cough and shortness of breath.    Gastrointestinal: Positive for abdominal pain and nausea. Negative for abdominal trauma, abdominal bloating, vomiting and diarrhea.   Genitourinary: Negative for dysuria, frequency, urgency and history of kidney stones.   Musculoskeletal: Negative for joint pain, joint swelling, muscle cramps and muscle ache.   Skin: Negative for color change, pale, rash and bruising.    Allergic/Immunologic: Negative for seasonal allergies.   Neurological: Negative for dizziness, history of vertigo, light-headedness, passing out and headaches.   Hematologic/Lymphatic: Negative for swollen lymph nodes.   Psychiatric/Behavioral: Negative for nervous/anxious, sleep disturbance and depression. The patient is not nervous/anxious.        Objective:      Physical Exam   Constitutional: She is oriented to person, place, and time. She appears well-developed and well-nourished. She is cooperative.  Non-toxic appearance. She does not appear ill. No distress.   HENT:   Head: Normocephalic and atraumatic.   Right Ear: Hearing, tympanic membrane, external ear and ear canal normal.   Left Ear: Hearing, tympanic membrane, external ear and ear canal normal.   Nose: Nose normal. No mucosal edema, rhinorrhea or nasal deformity. No epistaxis. Right sinus exhibits no maxillary sinus tenderness and no frontal sinus tenderness. Left sinus exhibits no maxillary sinus tenderness and no frontal sinus tenderness.   Mouth/Throat: Uvula is midline, oropharynx is clear and moist and mucous membranes are normal. No trismus in the jaw. Normal dentition. No uvula swelling. No posterior oropharyngeal erythema.   Eyes: Conjunctivae and lids are normal. Right eye exhibits no discharge. Left eye exhibits no discharge. No scleral icterus.   Sclera clear bilat   Neck: Trachea normal, normal range of motion, full passive range of motion without pain and phonation normal. Neck supple.   Cardiovascular: Normal rate, regular rhythm, normal heart sounds, intact distal pulses and normal pulses.   Pulmonary/Chest: Effort normal and breath sounds normal. No respiratory distress.   Abdominal: Soft. Normal appearance and bowel sounds are normal. She exhibits no distension, no pulsatile midline mass and no mass. There is tenderness in the right lower quadrant, suprapubic area and left lower quadrant. There is rebound (LLQ) and tenderness at  McBurney's point. There is no rigidity, no guarding, no CVA tenderness and negative Crowe's sign.       Musculoskeletal: Normal range of motion. She exhibits no edema or deformity.   Neurological: She is alert and oriented to person, place, and time. She exhibits normal muscle tone. Coordination normal.   Skin: Skin is warm, dry and intact. She is not diaphoretic. No pallor.   Psychiatric: She has a normal mood and affect. Her speech is normal and behavior is normal. Judgment and thought content normal. Cognition and memory are normal.   Nursing note and vitals reviewed.      Assessment:       1. Abdominal pain, unspecified abdominal location        Plan:     referred to ER for further evaluation to rule out appendicitis    Abdominal pain, unspecified abdominal location  -     POCT urine pregnancy  -     POCT Urinalysis, Dipstick, Automated, W/O Scope

## 2019-06-29 NOTE — LETTER
June 29, 2019      [facility]  [address]  [facility] [address]  Phone: [phone]       Patient: Kiki Molina   YOB: 1990  Date of Visit: 06/29/2019    To Whom It May Concern:    Chester Molina  was at [facility] on 06/29/2019. She may return to work/school on 7/1/19 with no restrictions. If you have any questions or concerns, or if I can be of further assistance, please do not hesitate to contact me.    Sincerely,    EDDIE Zendejas

## 2019-07-01 ENCOUNTER — TELEPHONE (OUTPATIENT)
Dept: FAMILY MEDICINE | Facility: CLINIC | Age: 29
End: 2019-07-01

## 2019-07-01 DIAGNOSIS — N28.89 RENAL MASS OF UNKNOWN NATURE: Primary | ICD-10-CM

## 2019-07-01 DIAGNOSIS — N28.89 RENAL MASS: ICD-10-CM

## 2019-07-01 DIAGNOSIS — N28.89 RENAL MASS, RIGHT: ICD-10-CM

## 2019-07-02 ENCOUNTER — TELEPHONE (OUTPATIENT)
Dept: PHYSICAL MEDICINE AND REHAB | Facility: CLINIC | Age: 29
End: 2019-07-02

## 2019-07-02 NOTE — TELEPHONE ENCOUNTER
----- Message from Avtar Bartholomew sent at 7/2/2019 11:26 AM CDT -----  Contact: pt  Type:  Sooner Apoointment Request    Caller is requesting a sooner appointment.  Caller declined first available appointment listed below.  Caller will not accept being placed on the waitlist and is requesting a message be sent to doctor.    Name of Caller:  pt  When is the first available appointment?  2020  Symptoms: 2nd opinion/fibromylagia/masss on left kidney from ER visit 6-29-19/smoking cessation  Best Call Back Number:  [phone] (anytime on 7-3-19 pt will be available for callback  Additional Information:  Was seen in [facility]

## 2019-07-02 NOTE — TELEPHONE ENCOUNTER
Returned patient's call.  Explained to patient that it's not much Dr Harris can do for her fibromyalgia due to her insurance doesn't pay for anything.  Advised patient to see a rheumatologist as they can be more helpful.  Patient verbalized understanding.

## 2019-07-05 ENCOUNTER — TELEPHONE (OUTPATIENT)
Dept: FAMILY MEDICINE | Facility: CLINIC | Age: 29
End: 2019-07-05

## 2019-07-05 NOTE — TELEPHONE ENCOUNTER
----- Message from EDDIE Flores sent at 7/3/2019  5:12 PM CDT -----  U/s shows a simple cyst in her kidney.  No treatment needed.

## 2019-07-26 RX ORDER — AMITRIPTYLINE HYDROCHLORIDE 25 MG/1
TABLET, FILM COATED ORAL
Refills: 2 | COMMUNITY
Start: 2019-06-25 | End: 2020-01-16

## 2019-09-27 DIAGNOSIS — G40.909 SEIZURE DISORDER: ICD-10-CM

## 2019-09-27 RX ORDER — LEVETIRACETAM 1000 MG/1
TABLET ORAL
Qty: 120 TABLET | Refills: 0 | Status: SHIPPED | OUTPATIENT
Start: 2019-09-27 | End: 2019-10-20 | Stop reason: SDUPTHER

## 2019-10-20 DIAGNOSIS — G40.909 SEIZURE DISORDER: ICD-10-CM

## 2019-10-21 RX ORDER — LEVETIRACETAM 1000 MG/1
TABLET ORAL
Qty: 120 TABLET | Refills: 0 | Status: SHIPPED | OUTPATIENT
Start: 2019-10-21

## 2019-10-21 NOTE — TELEPHONE ENCOUNTER
I am refilling, but she needs to see neurology for management of her seizures.  I did a referral at her last OV.

## 2019-10-31 ENCOUNTER — CLINICAL SUPPORT (OUTPATIENT)
Dept: URGENT CARE | Facility: CLINIC | Age: 29
End: 2019-10-31
Payer: COMMERCIAL

## 2019-10-31 VITALS
OXYGEN SATURATION: 98 % | DIASTOLIC BLOOD PRESSURE: 82 MMHG | TEMPERATURE: 98 F | SYSTOLIC BLOOD PRESSURE: 118 MMHG | WEIGHT: 169 LBS | BODY MASS INDEX: 33.18 KG/M2 | RESPIRATION RATE: 14 BRPM | HEART RATE: 107 BPM | HEIGHT: 60 IN

## 2019-10-31 DIAGNOSIS — M54.50 ACUTE RIGHT-SIDED LOW BACK PAIN WITHOUT SCIATICA: ICD-10-CM

## 2019-10-31 DIAGNOSIS — M62.838 MUSCLE SPASM OF RIGHT LOWER EXTREMITY: Primary | ICD-10-CM

## 2019-10-31 PROCEDURE — 99214 PR OFFICE/OUTPT VISIT, EST, LEVL IV, 30-39 MIN: ICD-10-PCS | Mod: 25,S$GLB,, | Performed by: NURSE PRACTITIONER

## 2019-10-31 PROCEDURE — 99214 OFFICE O/P EST MOD 30 MIN: CPT | Mod: 25,S$GLB,, | Performed by: NURSE PRACTITIONER

## 2019-10-31 PROCEDURE — 96372 PR INJECTION,THERAP/PROPH/DIAG2ST, IM OR SUBCUT: ICD-10-PCS | Mod: S$GLB,,, | Performed by: NURSE PRACTITIONER

## 2019-10-31 PROCEDURE — 96372 THER/PROPH/DIAG INJ SC/IM: CPT | Mod: S$GLB,,, | Performed by: NURSE PRACTITIONER

## 2019-10-31 RX ORDER — MELOXICAM 7.5 MG/1
7.5 TABLET ORAL DAILY
COMMUNITY
End: 2020-01-16

## 2019-10-31 RX ORDER — KETOROLAC TROMETHAMINE 30 MG/ML
30 INJECTION, SOLUTION INTRAMUSCULAR; INTRAVENOUS
Status: COMPLETED | OUTPATIENT
Start: 2019-10-31 | End: 2019-10-31

## 2019-10-31 RX ORDER — METHOCARBAMOL 750 MG/1
TABLET, FILM COATED ORAL
Qty: 30 TABLET | Refills: 0 | Status: SHIPPED | OUTPATIENT
Start: 2019-10-31 | End: 2020-01-16

## 2019-10-31 RX ORDER — DEXAMETHASONE SODIUM PHOSPHATE 4 MG/ML
8 INJECTION, SOLUTION INTRA-ARTICULAR; INTRALESIONAL; INTRAMUSCULAR; INTRAVENOUS; SOFT TISSUE
Status: COMPLETED | OUTPATIENT
Start: 2019-10-31 | End: 2019-10-31

## 2019-10-31 RX ORDER — IBUPROFEN 800 MG/1
800 TABLET ORAL EVERY 8 HOURS PRN
Qty: 30 TABLET | Refills: 0 | Status: SHIPPED | OUTPATIENT
Start: 2019-10-31 | End: 2019-11-10

## 2019-10-31 RX ADMIN — DEXAMETHASONE SODIUM PHOSPHATE 8 MG: 4 INJECTION, SOLUTION INTRA-ARTICULAR; INTRALESIONAL; INTRAMUSCULAR; INTRAVENOUS; SOFT TISSUE at 09:10

## 2019-10-31 RX ADMIN — KETOROLAC TROMETHAMINE 30 MG: 30 INJECTION, SOLUTION INTRAMUSCULAR; INTRAVENOUS at 09:10

## 2019-10-31 NOTE — PATIENT INSTRUCTIONS
Back Pain (Acute or Chronic)    Back pain is one of the most common problems. The good news is that most people feel better in 1 to 2 weeks, and most of the rest in 1 to 2 months. Most people can remain active.  People experience and describe pain differently; not everyone is the same.  · The pain can be sharp, stabbing, shooting, aching, cramping or burning.  · Movement, standing, bending, lifting, sitting, or walking may worsen pain.  · It can be localized to one spot or area, or it can be more generalized.  · It can spread or radiate upwards, to the front, or go down your arms or legs (sciatica).  · It can cause muscle spasm.  Most of the time, mechanical problems with the muscles or spine cause the pain. Mechanical problems are usually caused by an injury to the muscles or ligaments. While illness can cause back pain, it is usually not caused by a serious illness. Mechanical problems include:   · Physical activity such as sports, exercise, work, or normal activity  · Overexertion, lifting, pushing, pulling incorrectly or too aggressively  · Sudden twisting, bending, or stretching from an accident, or accidental movement  · Poor posture  · Stretching or moving wrong, without noticing pain at the time  · Poor coordination, lack of regular exercise (check with your doctor about this)  · Spinal disc disease or arthritis  · Stress  Pain can also be related to pregnancy, or illness like appendicitis, bladder or kidney infections, pelvic infections, and many other things.  Acute back pain usually gets better in 1 to 2 weeks. Back pain related to disk disease, arthritis in the spinal joints or spinal stenosis (narrowing of the spinal canal) can become chronic and last for months or years.  Unless you had a physical injury (for example, a car accident or fall) X-rays are usually not needed for the initial evaluation of back pain. If pain continues and does not respond to medical treatment, X-rays and other tests may be  needed.  Home care  Try these home care recommendations:  · When in bed, try to find a position of comfort. A firm mattress is best. Try lying flat on your back with pillows under your knees. You can also try lying on your side with your knees bent up towards your chest and a pillow between your knees.  · At first, do not try to stretch out the sore spots. If there is a strain, it is not like the good soreness you get after exercising without an injury. In this case, stretching may make it worse.  · Avoid prolong sitting, long car rides, or travel. This puts more stress on the lower back than standing or walking.  · During the first 24 to 72 hours after an acute injury or flare up of chronic back pain, apply an ice pack to the painful area for 20 minutes and then remove it for 20 minutes. Do this over a period of 60 to 90 minutes or several times a day. This will reduce swelling and pain. Wrap the ice pack in a thin towel or plastic to protect your skin.  · You can start with ice, then switch to heat. Heat (hot shower, hot bath, or heating pad) reduces pain and works well for muscle spasms. Heat can be applied to the painful area for 20 minutes then remove it for 20 minutes. Do this over a period of 60 to 90 minutes or several times a day. Do not sleep on a heating pad. It can lead to skin burns or tissue damage.  · You can alternate ice and heat therapy. Talk with your doctor about the best treatment for your back pain.  · Therapeutic massage can help relax the back muscles without stretching them.  · Be aware of safe lifting methods and do not lift anything without stretching first.  Medicines  Talk to your doctor before using medicine, especially if you have other medical problems or are taking other medicines.  · You may use over-the-counter medicine as directed on the bottle to control pain, unless another pain medicine was prescribed. If you have chronic conditions like diabetes, liver or kidney disease,  stomach ulcers, or gastrointestinal bleeding, or are taking blood thinners, talk to your doctor before taking any medicine.  · Be careful if you are given a prescription medicines, narcotics, or medicine for muscle spasms. They can cause drowsiness, affect your coordination, reflexes, and judgement. Do not drive or operate heavy machinery.  Follow-up care  Follow up with your healthcare provider, or as advised.   A radiologist will review any X-rays that were taken. Your provide will notify you of any new findings that may affect your care.  Call 911  Call emergency services if any of the following occur:  · Trouble breathing  · Confusion  · Very drowsy or trouble awakening  · Fainting or loss of consciousness  · Rapid or very slow heart rate  · Loss of bowel or bladder control  When to seek medical advice  Call your healthcare provider right away if any of these occur:   · Pain becomes worse or spreads to your legs  · Weakness or numbness in one or both legs  · Numbness in the groin or genital area  Date Last Reviewed: 7/1/2016 © 2000-2017 [employer]. [address] [place] [address]. All rights reserved. This information is not intended as a substitute for professional medical care. Always follow your healthcare professional's instructions.        Relieving Back Pain  Back pain is a common problem. You can strain back muscles by lifting too much weight or just by moving the wrong way. Back strain can be uncomfortable, even painful. And it can take weeks or months to improve. To help yourself feel better and prevent future back strains, try these tips.  Important Note: Do not give aspirin to children or teens without first discussing it with your healthcare provider.      ? Ice    Ice reduces muscle pain and swelling. It helps most during the first 24 to 48 hours after an injury.  · Wrap an ice pack or a bag of frozen peas in a thin towel. (Never place ice directly on your skin.)  · Place  the ice where your back hurts the most.  · Dont ice for more than 20 minutes at a time.  · You can use ice several times a day.  ? Medicines  Over-the-counter pain relievers can include acetaminophen and anti-inflammatory medicines, which includes aspirin or ibuprofen. They can help ease discomfort. Some also reduce swelling.  · Tell your healthcare provider about any medicines you are already taking.  · Take medicines only as directed.  ? Heat  After the first 48 hours, heat can relax sore muscles and improve blood flow.  · Try a warm bath or shower. Or use a heating pad set on low. To prevent a burn, keep a cloth between you and the heating pad.  · Dont use a heating pad for more than 15 minutes at a time. Never sleep on a heating pad.  Date Last Reviewed: 9/1/2015 © 2000-2017 [employer]. [address] [place] [address]. All rights reserved. This information is not intended as a substitute for professional medical care. Always follow your healthcare professional's instructions.        Back Spasm (No Trauma)    Spasm of the back muscles can occur after a sudden forceful twisting or bending force (such as in a car accident), after a simple awkward movement, or after lifting something heavy with poor body positioning. In any case, muscle spasm adds to the pain. Sleeping in an awkward position or on a poor quality mattress can also cause this. Some people respond to emotional stress by tensing the muscles of their back.  Pain that continues may need further evaluation or other types of treatment such as physical therapy.  You don't always need X-rays for the initial evaluation of back pain, unless you had a physical injury such as from a car accident or fall. If your pain continues and doesn't respond to medical treatment, X-rays and other tests may then be done.   Home care  · As soon as possible, start sitting or walking again to avoid problems from prolonged bed rest (muscle weakness,  worsening back stiffness and pain, blood clots in the legs).  · When in bed, try to find a position of comfort. A firm mattress is best. Try lying flat on your back with pillows under your knees. You can also try lying on your side with your knees bent up toward your chest and a pillow between your knees.  · Avoid prolonged sitting, long car rides, or travel. This puts more stress on the lower back than standing or walking.   · During the first 24 to 72 hours after an injury or flare-up, apply an ice pack to the painful area for 20 minutes, then remove it for 20 minutes. Do this over a period of 60 to 90 minutes or several times a day. This will reduce swelling and pain. Always wrap ice packs in a thin towel.  · You can start with ice, then switch to heat. Heat (hot shower, hot bath, or heating pad) reduces pain, and works well for muscle spasms. Apply heat to the painful area for 20 minutes, then remove it for 20 minutes. Do this over a period of 60 to 90 minutes or several times a day. Do not sleep on a heating pad as it can burn or damage skin.  · Alternate ice and heat therapies.  · Be aware of safe lifting methods and do not lift anything over 15 pounds until all the pain is gone.  Gentle stretching will help your back heal faster. Do this simple routine 2 to 3 times a day until your back is feeling better.  · Lie on your back with your knees bent and both feet on the ground  · Slowly raise your left knee to your chest as you flatten your lower back against the floor. Hold for 20 to 30 seconds.  · Relax and repeat the exercise with your right knee.  · Do 2 to 3 of these exercises for each leg.  · Repeat, hugging both knees to your chest at the same time.  · Do not bounce, but use a gentle pull.  Medicines  Talk to your doctor before using medicine, especially if you have other medical problems or are taking other medicines.  You may use acetaminophen or ibuprofen to control pain, unless your healthcare provider  prescribed another pain medicine. If you have a chronic condition such as diabetes, liver or kidney disease, stomach ulcer, or gastrointestinal bleeding, or are taking blood thinners, talk with your healthcare provider before taking any medicines.  Be careful if you are given prescription pain medicine, narcotics, or medicine for muscle spasm. They can cause drowsiness, affect your coordination, reflexes, or judgment. Do not drive or operate heavy machinery when taking these medicines. Take pain medicine only as prescribed by your healthcare provider.  Follow-up care  Follow up with your doctor, or as advised. Physical therapy or further tests may be needed.  If X-rays were taken, they may be reviewed by a radiologist. You will be notified of any new findings that may affect your care.  Call 911  Seek emergency medical care if any of these occur:  · Trouble breathing  · Confusion  · Drowsiness or trouble awakening  · Fainting or loss of consciousness  · Rapid or very slow heart rate  · Loss of bowel or bladder control  When to seek medical advice  Call your healthcare provider right away if any of these occur:  · Pain becomes worse or spreads to your legs  · Weakness or numbness in one or both legs  · Numbness in the groin or genital area  · Unexplained fever over 100.4ºF (38.0ºC)  · Burning or pain when passing urine  Date Last Reviewed: 6/1/2016  © 8262-6705 [employer]. [address] [place] [address]. All rights reserved. This information is not intended as a substitute for professional medical care. Always follow your healthcare professional's instructions.        Muscle Spasm  A muscle spasm is a sudden tightening of the muscle you cant control. This may be caused by strain, overworking the muscle, or injury. It can also be caused by dehydration, electrolyte imbalance, diabetes, alcohol use, and certain medicines. If it goes on long enough the muscle spasm causes pain. Common areas for  muscle spasm are the legs, neck, and back.  Home care  · Heat, massage, and stretching will help relax muscle spasm.  · When the spasm is in your arm or leg, stretch the muscle passively. To do this, have someone bend or straighten the joint above or below the muscle until you feel the stretch on the sore muscle. You can stretch the muscle actively by moving the affected body part. This will stretch the muscle that is in spasm. For example, if the spasm is in your calf, bend the ankle so your toes point upward toward your knee. This will stretch your calf muscle.  · You may use over-the-counter pain medicine to control pain, unless another medicine was prescribed. If you have chronic liver or kidney disease or ever had a stomach ulcer or GI bleeding, talk with your healthcare provider before using these medicines.  Follow-up care  Follow up with your healthcare provider, or as advised.    When to seek medical advice  Call your healthcare provider right away if any of the following occur:  · Fingers or toes become swollen, cold, blue, numb, or tingly  · You develop weakness in the affected arm or leg  · Pain increases and is not controlled by the above measures  Date Last Reviewed: 11/21/2015  © 3966-0446 [employer]. [address] [place] [address], [place] [address]. All rights reserved. This information is not intended as a substitute for professional medical care. Always follow your healthcare professional's instructions.

## 2019-10-31 NOTE — LETTER
October 31, 2019      [facility]  [address]  [facility] [address]  Phone: [phone]       Patient: Kiki Molina   YOB: 1990  Date of Visit: 10/31/2019    To Whom It May Concern:    Chester Molina  was at [facility] on 10/31/2019. She may return to work/school on 10/31/2019 with no restrictions. Please also excuse her on 10/30/2019 due to illness. If you have any questions or concerns, or if I can be of further assistance, please do not hesitate to contact me.    Sincerely,    Kajal Melgoza MA

## 2019-10-31 NOTE — LETTER
October 31, 2019      [facility]  [address]  [facility] [address]  Phone: [phone]       Patient: Kiki Molina   YOB: 1990  Date of Visit: 10/31/2019    To Whom It May Concern:    Chester Molina  was at [facility] on 10/31/2019. 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 If you have any questions or concerns, or if I can be of further assistance, please do not hesitate to contact me.    Sincerely,    Jody Hernandez, NP

## 2019-10-31 NOTE — PROGRESS NOTES
Subjective:       Patient ID: Kiki Molina is a 29 y.o. female.    Vitals:  height is 5' (1.524 m) and weight is 76.7 kg (169 lb). Her temperature is 98.2 °F (36.8 °C). Her blood pressure is 118/82 and her pulse is 107. Her respiration is 14 and oxygen saturation is 98%.     Chief Complaint: Spasms    Patient complains of right lower back pain and spasms in her back, right leg, and right foot for 2 days since wearing combat boots for work in the . Patient reports every time she wears combat boots she has muscle spasms. History of fibromyalgia. Denies numbness/tingling, bowel/bladder incontinence.     Back Pain   This is a new problem. The current episode started in the past 7 days. The problem occurs constantly. The problem is unchanged. Quality: Back pain. Associated symptoms include leg pain and tingling. Pertinent negatives include no chest pain, dysuria, fever, headaches or weakness. Treatments tried: magnisum. The treatment provided no relief.       Constitution: Negative for chills, fatigue and fever.   HENT: Negative for congestion and sore throat.    Neck: Negative for painful lymph nodes.   Cardiovascular: Negative for chest pain and leg swelling.   Eyes: Negative for double vision and blurred vision.   Respiratory: Negative for cough and shortness of breath.    Gastrointestinal: Negative for nausea, vomiting and diarrhea.   Genitourinary: Negative for dysuria, frequency, urgency and history of kidney stones.   Musculoskeletal: Positive for back pain, muscle cramps and muscle ache. Negative for joint pain and joint swelling.   Skin: Negative for color change, pale, rash and bruising.   Allergic/Immunologic: Negative for seasonal allergies.   Neurological: Negative for dizziness, history of vertigo, light-headedness, passing out and headaches.   Hematologic/Lymphatic: Negative for swollen lymph nodes.   Psychiatric/Behavioral: Negative for nervous/anxious, sleep disturbance and depression.  The patient is not nervous/anxious.        Objective:      Physical Exam   Constitutional: She is oriented to person, place, and time. Vital signs are normal. She appears well-developed and well-nourished. She is cooperative.  Non-toxic appearance. She does not have a sickly appearance. She does not appear ill. No distress.   HENT:   Head: Normocephalic and atraumatic.   Right Ear: Hearing, tympanic membrane, external ear and ear canal normal.   Left Ear: Hearing, tympanic membrane, external ear and ear canal normal.   Nose: Nose normal. No mucosal edema, rhinorrhea or nasal deformity. No epistaxis. Right sinus exhibits no maxillary sinus tenderness and no frontal sinus tenderness. Left sinus exhibits no maxillary sinus tenderness and no frontal sinus tenderness.   Mouth/Throat: Uvula is midline, oropharynx is clear and moist and mucous membranes are normal. No trismus in the jaw. Normal dentition. No uvula swelling. No posterior oropharyngeal erythema.   Eyes: Conjunctivae and lids are normal. Right eye exhibits no discharge. Left eye exhibits no discharge. No scleral icterus.   Neck: Trachea normal, normal range of motion, full passive range of motion without pain and phonation normal. Neck supple.   Cardiovascular: Normal rate, regular rhythm, normal heart sounds, intact distal pulses and normal pulses.   Pulmonary/Chest: Effort normal and breath sounds normal. No respiratory distress.   Abdominal: Soft. Normal appearance and bowel sounds are normal. She exhibits no distension, no pulsatile midline mass and no mass. There is no tenderness.   Musculoskeletal: She exhibits no edema or deformity.        Lumbar back: She exhibits decreased range of motion, tenderness, pain and spasm. She exhibits no bony tenderness, no swelling, no edema, no deformity, no laceration and normal pulse.        Back:    Neurological: She is alert and oriented to person, place, and time. She exhibits normal muscle tone. Coordination  normal. GCS eye subscore is 4. GCS verbal subscore is 5. GCS motor subscore is 6.   Skin: Skin is warm, dry, intact, not diaphoretic and not pale.   Psychiatric: She has a normal mood and affect. Her speech is normal and behavior is normal. Judgment and thought content normal. Cognition and memory are normal.   Nursing note and vitals reviewed.        Assessment:       1. Muscle spasm of right lower extremity    2. Acute right-sided low back pain without sciatica        Plan:       After complete evaluation, including thorough history and physical exam, the patient's symptoms are most likely due to mechanical/MSK back pain. There are no concerning features of bilateral weakness, bowel/bladder incontinence, significant new motor/sensory deficits, or saddle anesthesia to suggest acute cauda equina syndrome. On physical exam, there is no focal midline tenderness or evidence of significant trauma to suggest fracture or injury. There is no fever, immunocompromise, history of recent surgery, or erythema/fluctuance to suggest epidural hematoma, infection, or abscess. The patient was treated with supportive care decadron and toradol and improved. Will provide short course RX of motrin and robaxin upon D/C. Counseled on resuming activity as tolerated to accelerate improvement in symptoms.    For acute pain, rest, intermittent application of cold packs (later, may switch to heat, but do not sleep on heating pad), analgesics and muscle relaxants are recommended. Discussed longer term treatment plan of prn NSAID's and discussed a home back care exercise program with flexion exercise routine. Proper lifting with avoidance of heavy lifting discussed. Consider Physical Therapy and XRay studies if not improving. Call or return to clinic prn if these symptoms worsen or fail to improve as anticipated.    Muscle spasm of right lower extremity    Acute right-sided low back pain without sciatica    Other orders  -     ketorolac  injection 30 mg  -     dexamethasone injection 8 mg  -     ibuprofen (ADVIL,MOTRIN) 800 MG tablet; Take 1 tablet (800 mg total) by mouth every 8 (eight) hours as needed for Pain.  Dispense: 30 tablet; Refill: 0  -     methocarbamol (ROBAXIN) 750 MG Tab; Take 1-2 tablets by mouth every 8 hours as needed for muscle spasms  Dispense: 30 tablet; Refill: 0

## 2019-12-23 RX ORDER — PREGABALIN 50 MG/1
CAPSULE ORAL
COMMUNITY
End: 2020-01-16

## 2019-12-23 RX ORDER — SPIRONOLACTONE 25 MG/1
TABLET ORAL
COMMUNITY
Start: 2019-11-13 | End: 2021-08-09

## 2019-12-23 RX ORDER — GABAPENTIN 100 MG/1
CAPSULE ORAL
COMMUNITY
End: 2020-01-16

## 2019-12-23 RX ORDER — VENLAFAXINE HYDROCHLORIDE 75 MG/1
CAPSULE, EXTENDED RELEASE ORAL
COMMUNITY
End: 2020-01-16

## 2020-01-06 ENCOUNTER — CLINICAL SUPPORT (OUTPATIENT)
Dept: URGENT CARE | Facility: CLINIC | Age: 30
End: 2020-01-06
Payer: COMMERCIAL

## 2020-01-06 VITALS
RESPIRATION RATE: 12 BRPM | OXYGEN SATURATION: 98 % | HEART RATE: 90 BPM | DIASTOLIC BLOOD PRESSURE: 80 MMHG | WEIGHT: 168 LBS | BODY MASS INDEX: 32.81 KG/M2 | SYSTOLIC BLOOD PRESSURE: 121 MMHG

## 2020-01-06 DIAGNOSIS — S16.1XXD CERVICAL STRAIN, ACUTE, SUBSEQUENT ENCOUNTER: Primary | ICD-10-CM

## 2020-01-06 PROCEDURE — 99214 PR OFFICE/OUTPT VISIT, EST, LEVL IV, 30-39 MIN: ICD-10-PCS | Mod: S$GLB,,, | Performed by: NURSE PRACTITIONER

## 2020-01-06 PROCEDURE — 99214 OFFICE O/P EST MOD 30 MIN: CPT | Mod: S$GLB,,, | Performed by: NURSE PRACTITIONER

## 2020-01-06 RX ORDER — METHOCARBAMOL 500 MG/1
1000 TABLET, FILM COATED ORAL 3 TIMES DAILY
Qty: 60 TABLET | Refills: 0 | Status: SHIPPED | OUTPATIENT
Start: 2020-01-06 | End: 2020-01-16

## 2020-01-06 NOTE — PATIENT INSTRUCTIONS
Understanding Cervical Strain    There are 7 bones (vertebrae) in the neck that are part of the spine. These are called the cervical spine. Cervical strain is a medical term for neck pain. The neck has several layers of muscles. These are connected with tendons to the cervical spine and other bones. Neck pain is often the result of injury to these muscles and tendons.  Causes of cervical strain  Different types of stress on the neck can damage muscles and tendons (soft tissues) and cause cervical strain. Cervical tissues can be damaged by:  · The neck being forced past its normal range of motion, such as in a car accident or sports injury  · Constant, low-level stress, such as from poor posture or a poorly set-up workspace  Symptoms of cervical strain  These may include:  · Neck pain or stiffness  · Pain in the shoulders or upper back  · Muscle spasms  · Headache, often starting at the base of the neck  · Irritability, difficulty concentrating, or sleeplessness  Treatment for cervical strain  This problem often gets better on its own. Treatments aim to reduce pain and inflammation and increase the range of motion of the neck. Possible treatments include:  · Over-the-counter or prescription pain medicine. These help relieve pain and inflammation.  · Stretching exercises to decrease neck stiffness.  · Massage to decrease neck stiffness.  · Cold or heat pack. These help reduce pain and swelling.  Call 911  Call emergency services right away if you have any of these:  · Face drooping or numbness  · Numbness or weakness, especially in the arms or on one side  · Slurred speech or difficulty speaking  · Blurred vision   When to call your healthcare provider  Call your healthcare provider right away if you have any of these:  · Fever of 100.4°F (38°C) or higher, or as directed  · Pain or stiffness that gets worse  · Symptoms that dont get better, or get worse  · Numbness, tingling, weakness or shooting pains into the  arms or legs  · New symptoms  Date Last Reviewed: 3/10/2016  © 3524-7166 [employer] [address] [place] [address], [place] [address]. All rights reserved. This information is not intended as a substitute for professional medical care. Always follow your healthcare professional's instructions.

## 2020-01-06 NOTE — PROGRESS NOTES
Subjective:       Patient ID: Kiki Molina is a 29 y.o. female.    Vitals:  weight is 76.2 kg (168 lb). Her blood pressure is 121/80 and her pulse is 90. Her respiration is 12 and oxygen saturation is 98%.     Chief Complaint: Back Pain    Back Pain   This is a new problem. The current episode started in the past 7 days. The problem occurs constantly. The pain is present in the thoracic spine (back, neck and hip pain ). The quality of the pain is described as aching, stabbing and shooting. Radiates to: radiates to the front of chest ( rib area )  The pain is at a severity of 8/10. The pain is moderate. The pain is the same all the time. The symptoms are aggravated by bending, lying down, position and urinating. (Muscle spasms) She has tried NSAIDs (benadryl last dosage was 730 am of motrin ) for the symptoms. The treatment provided no relief.       Constitution: Negative.   HENT: Negative.    Neck: negative. Positive for neck pain.   Cardiovascular: Negative.    Eyes: Negative.    Respiratory: Negative.    Gastrointestinal: Negative.    Musculoskeletal: Positive for back pain.   Skin: Negative.  Negative for erythema.   Psychiatric/Behavioral: Negative.        Objective:      Physical Exam   Constitutional: She is oriented to person, place, and time. She appears well-developed and well-nourished.   HENT:   Head: Normocephalic.   Right Ear: External ear normal.   Left Ear: External ear normal.   Mouth/Throat: Oropharynx is clear and moist.   Eyes: Pupils are equal, round, and reactive to light. Conjunctivae are normal.   Neck: Neck supple.   Cardiovascular: Normal rate, regular rhythm, normal heart sounds and intact distal pulses. Exam reveals no gallop and no friction rub.   No murmur heard.  Pulmonary/Chest: Effort normal and breath sounds normal. No stridor. No respiratory distress. She has no wheezes.   Abdominal: Soft. Bowel sounds are normal. There is no tenderness.   Musculoskeletal: She exhibits no  edema, tenderness or deformity.   Lymphadenopathy:     She has no cervical adenopathy.   Neurological: She is alert and oriented to person, place, and time.   Skin: Skin is warm, dry, not pale and no rash. Capillary refill takes less than 2 seconds. erythema  Psychiatric: She has a normal mood and affect. Her behavior is normal. Judgment and thought content normal.   Nursing note and vitals reviewed.        Assessment:       1. Cervical strain, acute, subsequent encounter        Plan:         Cervical strain, acute, subsequent encounter  -     methocarbamol (ROBAXIN) 500 MG Tab; Take 2 tablets (1,000 mg total) by mouth 3 (three) times daily. for 10 days  Dispense: 60 tablet; Refill: 0

## 2020-01-15 RX ORDER — PAROXETINE HYDROCHLORIDE HEMIHYDRATE 12.5 MG/1
12.5 TABLET, FILM COATED, EXTENDED RELEASE ORAL
COMMUNITY
Start: 2019-12-04 | End: 2020-06-15

## 2020-01-15 RX ORDER — PERAMPANEL 4 MG/1
TABLET ORAL
COMMUNITY
Start: 2020-01-01 | End: 2020-02-14

## 2020-01-16 ENCOUNTER — OFFICE VISIT (OUTPATIENT)
Dept: FAMILY MEDICINE | Facility: CLINIC | Age: 30
End: 2020-01-16
Payer: COMMERCIAL

## 2020-01-16 VITALS
BODY MASS INDEX: 32.85 KG/M2 | WEIGHT: 167.31 LBS | OXYGEN SATURATION: 98 % | DIASTOLIC BLOOD PRESSURE: 82 MMHG | SYSTOLIC BLOOD PRESSURE: 128 MMHG | HEIGHT: 60 IN | HEART RATE: 75 BPM

## 2020-01-16 DIAGNOSIS — G89.29 NECK PAIN, CHRONIC: ICD-10-CM

## 2020-01-16 DIAGNOSIS — M54.6 CHRONIC BILATERAL THORACIC BACK PAIN: Primary | ICD-10-CM

## 2020-01-16 DIAGNOSIS — Z23 NEED FOR VACCINATION AGAINST STREPTOCOCCUS PNEUMONIAE: ICD-10-CM

## 2020-01-16 DIAGNOSIS — F43.10 PTSD (POST-TRAUMATIC STRESS DISORDER): ICD-10-CM

## 2020-01-16 DIAGNOSIS — M54.2 NECK PAIN, CHRONIC: ICD-10-CM

## 2020-01-16 DIAGNOSIS — G89.29 CHRONIC BILATERAL THORACIC BACK PAIN: Primary | ICD-10-CM

## 2020-01-16 PROBLEM — R56.9 SEIZURES: Status: ACTIVE | Noted: 2020-01-16

## 2020-01-16 PROCEDURE — 3079F PR MOST RECENT DIASTOLIC BLOOD PRESSURE 80-89 MM HG: ICD-10-PCS | Mod: S$GLB,,, | Performed by: NURSE PRACTITIONER

## 2020-01-16 PROCEDURE — 96372 PR INJECTION,THERAP/PROPH/DIAG2ST, IM OR SUBCUT: ICD-10-PCS | Mod: S$GLB,,, | Performed by: NURSE PRACTITIONER

## 2020-01-16 PROCEDURE — 90732 PNEUMOCOCCAL POLYSACCHARIDE VACCINE 23-VALENT =>2YO SQ IM: ICD-10-PCS | Mod: S$GLB,,, | Performed by: NURSE PRACTITIONER

## 2020-01-16 PROCEDURE — 90732 PPSV23 VACC 2 YRS+ SUBQ/IM: CPT | Mod: S$GLB,,, | Performed by: NURSE PRACTITIONER

## 2020-01-16 PROCEDURE — 3074F SYST BP LT 130 MM HG: CPT | Mod: S$GLB,,, | Performed by: NURSE PRACTITIONER

## 2020-01-16 PROCEDURE — 90471 PNEUMOCOCCAL POLYSACCHARIDE VACCINE 23-VALENT =>2YO SQ IM: ICD-10-PCS | Mod: 59,S$GLB,, | Performed by: NURSE PRACTITIONER

## 2020-01-16 PROCEDURE — 3074F PR MOST RECENT SYSTOLIC BLOOD PRESSURE < 130 MM HG: ICD-10-PCS | Mod: S$GLB,,, | Performed by: NURSE PRACTITIONER

## 2020-01-16 PROCEDURE — 3079F DIAST BP 80-89 MM HG: CPT | Mod: S$GLB,,, | Performed by: NURSE PRACTITIONER

## 2020-01-16 PROCEDURE — 3008F PR BODY MASS INDEX (BMI) DOCUMENTED: ICD-10-PCS | Mod: S$GLB,,, | Performed by: NURSE PRACTITIONER

## 2020-01-16 PROCEDURE — 99214 OFFICE O/P EST MOD 30 MIN: CPT | Mod: 25,S$GLB,, | Performed by: NURSE PRACTITIONER

## 2020-01-16 PROCEDURE — 99214 PR OFFICE/OUTPT VISIT, EST, LEVL IV, 30-39 MIN: ICD-10-PCS | Mod: 25,S$GLB,, | Performed by: NURSE PRACTITIONER

## 2020-01-16 PROCEDURE — 3008F BODY MASS INDEX DOCD: CPT | Mod: S$GLB,,, | Performed by: NURSE PRACTITIONER

## 2020-01-16 PROCEDURE — 99215 OFFICE O/P EST HI 40 MIN: CPT | Performed by: NURSE PRACTITIONER

## 2020-01-16 PROCEDURE — 96372 THER/PROPH/DIAG INJ SC/IM: CPT | Mod: S$GLB,,, | Performed by: NURSE PRACTITIONER

## 2020-01-16 PROCEDURE — 90471 IMMUNIZATION ADMIN: CPT | Mod: 59,S$GLB,, | Performed by: NURSE PRACTITIONER

## 2020-01-16 RX ORDER — KETOROLAC TROMETHAMINE 30 MG/ML
60 INJECTION, SOLUTION INTRAMUSCULAR; INTRAVENOUS
Status: COMPLETED | OUTPATIENT
Start: 2020-01-16 | End: 2020-01-16

## 2020-01-16 RX ADMIN — KETOROLAC TROMETHAMINE 60 MG: 30 INJECTION, SOLUTION INTRAMUSCULAR; INTRAVENOUS at 12:01

## 2020-01-16 NOTE — PROGRESS NOTES
SUBJECTIVE:      Patient ID: Kiki Molina is a 29 y.o. female.    Chief Complaint: Neck Pain ( since 2/2017) and Back Pain ( since 2/2017)    Kiki is here today with c/o increasing neck and back pain.  She reports a remote injury from an incident in 2017.  She states she has had pain off and on since then and over the last couple of months the pain has become more severe.  She has been seeing a mental health nurse practitioner for treatment of PTSD, anxiety and depression as well.  She states that provider wants her to be evaluated by a psychologist for traumatic brain injury.    Neck Pain    This is a chronic problem. The current episode started more than 1 year ago. The pain is associated with a remote injury. The pain is present in the left side. The quality of the pain is described as cramping, shooting and aching. The pain is at a severity of 6/10. The pain is moderate. The pain is worse during the day. Pertinent negatives include no chest pain, fever, headaches, leg pain, numbness, pain with swallowing, paresis, photophobia, syncope, tingling, trouble swallowing, visual change, weakness or weight loss. She has tried acetaminophen, muscle relaxants and NSAIDs for the symptoms. The treatment provided mild relief.   Back Pain   This is a chronic problem. The current episode started more than 1 year ago. The problem occurs constantly. The pain is present in the thoracic spine. Radiates to: chest and abdomen. The pain is moderate. The pain is worse during the day. The symptoms are aggravated by bending, coughing, position and twisting. Pertinent negatives include no abdominal pain, bladder incontinence, bowel incontinence, chest pain, dysuria, fever, headaches, leg pain, numbness, paresis, paresthesias, pelvic pain, perianal numbness, tingling, weakness or weight loss. Risk factors include lack of exercise and obesity. She has tried heat, muscle relaxant and NSAIDs for the symptoms. The treatment  provided mild relief.       Past Surgical History:   Procedure Laterality Date     SECTION  2011    FOOT SURGERY Right     2    LAPAROTOMY, EXPLORATORY      OOPHORECTOMY       Family History   Problem Relation Age of Onset    No Known Problems Mother     Seizures Father       Social History     Socioeconomic History    Marital status: Single     Spouse name: Not on file    Number of children: Not on file    Years of education: Not on file    Highest education level: Not on file   Occupational History    Not on file   Social Needs    Financial resource strain: Not on file    Food insecurity:     Worry: Not on file     Inability: Not on file    Transportation needs:     Medical: Not on file     Non-medical: Not on file   Tobacco Use    Smoking status: Current Every Day Smoker    Smokeless tobacco: Never Used   Substance and Sexual Activity    Alcohol use: No    Drug use: No    Sexual activity: Not on file   Lifestyle    Physical activity:     Days per week: Not on file     Minutes per session: Not on file    Stress: Not on file   Relationships    Social connections:     Talks on phone: Not on file     Gets together: Not on file     Attends [employer] service: Not on file     Active member of club or organization: Not on file     Attends meetings of clubs or organizations: Not on file     Relationship status: Not on file   Other Topics Concern    Not on file   Social History Narrative    Not on file     Current Outpatient Medications   Medication Sig Dispense Refill    levETIRAcetam (KEPPRA) 1000 MG tablet TAKE 1 AND 1/2 TABLETS BY MOUTH TWICE DAILY 120 tablet 0    methocarbamol (ROBAXIN) 500 MG Tab Take 2 tablets (1,000 mg total) by mouth 3 (three) times daily. for 10 days 60 tablet 0    paroxetine (PAXIL-CR) 12.5 MG 24 hr tablet Take 12.5 mg by mouth.      spironolactone (ALDACTONE) 25 MG tablet       FYCOMPA 4 mg Tab        Current Facility-Administered Medications   Medication  Dose Route Frequency Provider Last Rate Last Dose    ketorolac injection 60 mg  60 mg Intramuscular 1 time in [facility]         Review of patient's allergies indicates:   Allergen Reactions    Percocet [oxycodone-acetaminophen] Rash    Lamictal [lamotrigine] Rash      Past Medical History:   Diagnosis Date    Bursitis, trochanteric     Diffuse arthralgia     Fibromyalgia     Heavy tobacco smoker     Pain in lower back     Pinched nerve in neck     Plantar fasciitis, bilateral     PTSD (post-traumatic stress disorder)     PTSD (post-traumatic stress disorder)     PTSD (post-traumatic stress disorder)     Seizures      Past Surgical History:   Procedure Laterality Date     SECTION  2011    FOOT SURGERY Right     2    LAPAROTOMY, EXPLORATORY      OOPHORECTOMY         Review of Systems   Constitutional: Negative for activity change, appetite change, chills, diaphoresis, fatigue, fever, unexpected weight change and weight loss.   HENT: Negative for congestion, nosebleeds, rhinorrhea, sore throat, trouble swallowing and voice change.    Eyes: Negative for photophobia, pain, discharge and visual disturbance.   Respiratory: Negative for apnea, cough, shortness of breath and wheezing.    Cardiovascular: Negative for chest pain, palpitations and syncope.   Gastrointestinal: Negative for abdominal pain, bowel incontinence, constipation, diarrhea, nausea and vomiting.   Endocrine: Negative for polydipsia, polyphagia and polyuria.   Genitourinary: Negative for bladder incontinence, difficulty urinating, dysuria, frequency, menstrual problem, pelvic pain, urgency and vaginal discharge.   Musculoskeletal: Positive for arthralgias, back pain and neck pain. Negative for gait problem and myalgias.   Skin: Negative for color change, pallor and rash.   Allergic/Immunologic: Negative for immunocompromised state.   Neurological: Negative for dizziness, tingling, syncope, weakness,  numbness, headaches and paresthesias.   Hematological: Negative for adenopathy. Does not bruise/bleed easily.   Psychiatric/Behavioral: Positive for agitation, dysphoric mood and sleep disturbance. Negative for confusion, self-injury and suicidal ideas. The patient is nervous/anxious.       OBJECTIVE:      Vitals:    01/16/20 1055   BP: 128/82   Pulse: 75   SpO2: 98%   Weight: 75.9 kg (167 lb 4.8 oz)   Height: 5' (1.524 m)     Physical Exam   Constitutional: She is oriented to person, place, and time. She appears well-developed and well-nourished. No distress.   HENT:   Head: Normocephalic and atraumatic.   Right Ear: Tympanic membrane, external ear and ear canal normal.   Left Ear: Tympanic membrane, external ear and ear canal normal.   Nose: Nose normal. No mucosal edema or rhinorrhea.   Mouth/Throat: Uvula is midline and oropharynx is clear and moist. No oropharyngeal exudate, posterior oropharyngeal edema or posterior oropharyngeal erythema.   Eyes: Pupils are equal, round, and reactive to light. Conjunctivae, EOM and lids are normal. Right eye exhibits no discharge. Left eye exhibits no discharge. No scleral icterus.   Neck: Normal range of motion. Neck supple. Carotid bruit is not present. No tracheal deviation present. No thyromegaly present.   Cardiovascular: Normal rate, regular rhythm, normal heart sounds and intact distal pulses. Exam reveals no gallop and no friction rub.   No murmur heard.  Pulmonary/Chest: Effort normal and breath sounds normal. No stridor. No respiratory distress. She has no wheezes. She has no rales.   Abdominal: Soft. Bowel sounds are normal. She exhibits no distension and no mass. There is no hepatosplenomegaly. There is no tenderness. There is no rigidity, no rebound, no guarding and no CVA tenderness.   Musculoskeletal: Normal range of motion. She exhibits no edema.   Lymphadenopathy:     She has no cervical adenopathy.   Neurological: She is alert and oriented to person, place,  and time.   Skin: Skin is warm, dry and intact. Capillary refill takes less than 2 seconds. No rash noted. She is not diaphoretic. No erythema.   Psychiatric: She has a normal mood and affect. Her behavior is normal. Judgment and thought content normal. She expresses no suicidal plans.   Vitals reviewed.     Assessment:       1. Chronic bilateral thoracic back pain    2. Neck pain, chronic    3. PTSD (post-traumatic stress disorder)    4. Need for vaccination against Streptococcus pneumoniae        Plan:       Chronic bilateral thoracic back pain   Recommend eval by Dr. Paul; do not have x-rays done if able to see Dr. Paul soon; understanding voiced.  -     X-Ray Thoracic Spine 4 or more views; Future  -     Ambulatory Referral to Physical Medicine Rehab  -     ketorolac injection 60 mg    Neck pain, chronic   Recommend eval by Dr. Paul; do not have x-rays done if able to see Dr. Palu soon; understanding voiced.  -     X-Ray Cervical Spine AP And Lateral; Future  -     Ambulatory Referral to Physical Medicine Rehab  -     ketorolac injection 60 mg    PTSD (post-traumatic stress disorder)  -     Ambulatory Referral to Psychology    Need for vaccination against Streptococcus pneumoniae  -     (In Office Administered) Pneumococcal Polysaccharide Vaccine (23 Valent) (SQ/IM)        Follow up if symptoms worsen or fail to improve.      1/16/2020 TERRI Francis, AMBERP

## 2020-01-16 NOTE — PATIENT INSTRUCTIONS
Neck Pain    There are several possible causes of neck pain when there is no injury:  · You can get a minor ligament sprain or muscle strain from a sudden minor neck movement. Sleeping with your neck in an awkward position can also cause this.  · Some people respond to emotional stress by tensing the muscles of their neck, shoulders, and upper back. Chronic spasm in these muscles can cause neck pain and sometimes headaches.  · Gradual wear and tear of the joints in the spine can cause degenerative arthritis. This can be a source of occasional or chronic neck pain.  · The spinal disks may bulge and put pressure on a nearby spinal nerve. This can happen as a natural result of aging or repeated small injuries to the neck. The spinal disks are the cushions between each spinal bone. This causes tingling, pain, or numbness that spreads from the neck to the shoulder, arm, or hand on one side.  Acute neck pain usually gets better in 1 to 2 weeks. Neck pain related to disk disease, arthritis in the spinal joints, or spinal stenosis can become chronic and last for months or years. Spinal stenosis is narrowing of the spinal canal.  X-rays are usually not ordered for the initial evaluation of neck pain. However, X-rays may be done if you had a forceful physical injury, such as a car accident or fall. If pain continues and doesnt respond to medical treatment, X-rays and other tests may be done at a later time.  Home care  · Rest and relax the muscles. Use a comfortable pillow that supports the head. It should also help keep the spine in a neutral position. The position of the head should not be tilted forward or backward. A rolled up towel may help for a custom fit.  · Some people find relief with heat. Heat can be applied with either a warm shower or bath or a moist towel heated in the microwave and massage. Others prefer cold packs. You can make an ice pack by filling a plastic bag that seals at the top with ice cubes or  crushed ice and then wrapping it with a thin towel. Try both and use the method that feels best for 15 to 20 minutes, several times a day.  · Whether using ice or heat, be careful that you do not injure your skin. Never put ice directly on the skin. Always wrap the ice in a towel or other type of cloth.This is very important, especially in people with poor skin sensations.   · Try to reduce your stress level. Emotional stress can lead to neck muscle tension and get in the way of or delay the healing process.  · You may use over-the-counter pain medicine to control pain, unless another medicine was prescribed. If you have chronic liver or kidney disease or ever had a stomach ulcer or GI bleeding, talk with your healthcare provider before using these medicines.  Follow-up care  Follow up with your healthcare provider if your symptoms do not show signs of improvement after one week. Physical therapy or further tests may be needed.  If X-rays, CT scans, or MRI scans were taken, you will be told of any new findings that may affect your care.  Call 911  Call 911 if you have:  · Sudden weakness or numbness in one or both arms  · Neck swelling, difficulty or painful swallowing  · Difficulty breathing  · Chest pain  When to seek medical advice  Call your healthcare provider right away if any of these occur:  · Pain becomes worse or spreads into one or both arm  · Increasing headache  · Fever of 100.4°F (38°C) or above lasting for 24 to 48 hours  Date Last Reviewed: 7/1/2016  © 8280-5215 [employer]. [address] [place] [address], [place] [address]. All rights reserved. This information is not intended as a substitute for professional medical care. Always follow your healthcare professional's instructions.        Exercises to Strengthen Your Lower Back  Strong lower back and abdominal muscles work together to support your spine. The exercises below will help strengthen the lower back. It is important that you begin  exercising slowly and increase levels gradually.  Always begin any exercise program with stretching. If you feel pain while doing any of these exercises, stop and talk to your doctor about a more specific exercise program that better suits your condition.   Low back stretch  The point of stretching is to make you more flexible and increase your range of motion. Stretch only as much as you are able. Stretch slowly. Do not push your stretch to the limit. If at any point you feel pain while stretching, this is your (temporary) limit.  · Lie on your back with your knees bent and both feet on the ground.  · Slowly raise your left knee to your chest as you flatten your lower back against the floor. Hold for 5 seconds.  · Relax and repeat the exercise with your right knee.  · Do 10 of these exercises for each leg.  · Repeat hugging both knees to your chest at the same time.  Building lower back strength  Start your exercise routine with 10 to 30 minutes a day, 1 to 3 times a day.  Initial exercises  Lying on your back:  1. Ankle pumps: Move your foot up and down, towards your head, and then away. Repeat 10 times with each foot.  2. Heel slides: Slowly bend your knee, drawing the heel of your foot towards you. Then slide your heel/foot from you, straightening your knee. Do not lift your foot off the floor (this is not a leg lift).  3. Abdominal contraction: Bend your knees and put your hands on your stomach. Tighten your stomach muscles. Hold for 5 seconds, then relax. Repeat 10 times.  4. Straight leg raise: Bend one leg at the knee and keep the other leg straight. Tighten your stomach muscles. Slowly lift your straight leg 6 to 12 inches off the floor and hold for up to 5 seconds. Repeat 10 times on each side.  Standin. Wall squats: Stand with your back against the wall. Move your feet about 12 inches away from the wall. Tighten your stomach muscles, and slowly bend your knees until they are at about a 45 degree  angle. Do not go down too far. Hold about 5 seconds. Then slowly return to your starting position. Repeat 10 times.  2. Heel raises: Stand facing the wall. Slowly raise the heels of your feet up and down, while keeping your toes on the floor. If you have trouble balancing, you can touch the wall with your hands. Repeat 10 times.  More advanced exercises  When you feel comfortable enough, try these exercises.  1. Kneeling lumbar extension: Begin on your hands and knees. At the same time, raise and straighten your right arm and left leg until they are parallel to the ground. Hold for 2 seconds and come back slowly to a starting position. Repeat with left arm and right leg, alternating 10 times.  2. Prone lumbar extension: Lie face down, arms extended overhead, palms on the floor. At the same time, raise your right arm and left leg as high as comfortably possible. Hold for 10 seconds and slowly return to start. Repeat with left arm and right leg, alternating 10 times. Gradually build up to 20 times. (Advanced: Repeat this exercise raising both arms and both legs a few inches off the floor at the same time. Hold for 5 seconds and release.)  3. Pelvic tilt: Lie on the floor on your back with your knees bent at 90 degrees. Your feet should be flat on the floor. Inhale, exhale, then slowly contract your abdominal muscles bringing your navel toward your spine. Let your pelvis rock back until your lower back is flat on the floor. Hold for 10 seconds while breathing smoothly.  4. Abdominal crunch: Perform a pelvic tilt (above) flattening your lower back against the floor. Holding the tension in your abdominal muscles, take another breath and raise your shoulder blades off the ground (this is not a full sit-up). Keep your head in line with your body (dont bend your neck forward). Hold for 2 seconds, then slowly lower.  Date Last Reviewed: 6/1/2016  © 8513-0968 [employer] [address] [place] [address], [place]  [address]. All rights reserved. This information is not intended as a substitute for professional medical care. Always follow your healthcare professional's instructions.

## 2020-01-23 ENCOUNTER — INITIAL CONSULT (OUTPATIENT)
Dept: SPINE | Facility: CLINIC | Age: 30
End: 2020-01-23
Payer: COMMERCIAL

## 2020-01-23 VITALS
DIASTOLIC BLOOD PRESSURE: 80 MMHG | HEIGHT: 60 IN | BODY MASS INDEX: 32.85 KG/M2 | HEART RATE: 74 BPM | SYSTOLIC BLOOD PRESSURE: 119 MMHG | WEIGHT: 167.31 LBS

## 2020-01-23 DIAGNOSIS — M54.2 CERVICALGIA: Primary | ICD-10-CM

## 2020-01-23 DIAGNOSIS — M54.6 PAIN IN THORACIC SPINE: ICD-10-CM

## 2020-01-23 DIAGNOSIS — M54.6 THORACIC SPINE PAIN: ICD-10-CM

## 2020-01-23 PROCEDURE — 3008F BODY MASS INDEX DOCD: CPT | Mod: S$GLB,,, | Performed by: PHYSICAL MEDICINE & REHABILITATION

## 2020-01-23 PROCEDURE — 3079F DIAST BP 80-89 MM HG: CPT | Mod: S$GLB,,, | Performed by: PHYSICAL MEDICINE & REHABILITATION

## 2020-01-23 PROCEDURE — 3074F SYST BP LT 130 MM HG: CPT | Mod: S$GLB,,, | Performed by: PHYSICAL MEDICINE & REHABILITATION

## 2020-01-23 PROCEDURE — 99204 OFFICE O/P NEW MOD 45 MIN: CPT | Mod: S$GLB,,, | Performed by: PHYSICAL MEDICINE & REHABILITATION

## 2020-01-23 PROCEDURE — 3074F PR MOST RECENT SYSTOLIC BLOOD PRESSURE < 130 MM HG: ICD-10-PCS | Mod: S$GLB,,, | Performed by: PHYSICAL MEDICINE & REHABILITATION

## 2020-01-23 PROCEDURE — 3079F PR MOST RECENT DIASTOLIC BLOOD PRESSURE 80-89 MM HG: ICD-10-PCS | Mod: S$GLB,,, | Performed by: PHYSICAL MEDICINE & REHABILITATION

## 2020-01-23 PROCEDURE — 3008F PR BODY MASS INDEX (BMI) DOCUMENTED: ICD-10-PCS | Mod: S$GLB,,, | Performed by: PHYSICAL MEDICINE & REHABILITATION

## 2020-01-23 PROCEDURE — 99204 PR OFFICE/OUTPT VISIT, NEW, LEVL IV, 45-59 MIN: ICD-10-PCS | Mod: S$GLB,,, | Performed by: PHYSICAL MEDICINE & REHABILITATION
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
## 2020-01-23 NOTE — LETTER
January 23, 2020      Cathi Harris, FNP  [address]  [place] [address]           [facility]  [address] [place] [address] [address]  [place] [address]  Phone: [phone]  Fax: [phone]          Patient: Kiki Molina   MR Number: [id number]   YOB: 1990   Date of Visit: 1/23/2020       Dear Cathi Harris:    Thank you for referring Kiki Molina to me for evaluation. Attached you will find relevant portions of my assessment and plan of care.    If you have questions, please do not hesitate to call me. I look forward to following Kiki Molina along with you.    Sincerely,    Sebastian Paul MD    Enclosure  CC:  No Recipients    If you would like to receive this communication electronically, please contact [email or web address] or [phone] to request more information on [employer] Link access.    For providers and/or their staff who would like to refer a patient to [facility], please contact us through our one-stop-shop provider referral line, [facility], at [phone].    If you feel you have received this communication in error or would no longer like to receive these types of communications, please e-mail [email or web address]

## 2020-01-30 ENCOUNTER — TELEPHONE (OUTPATIENT)
Dept: SPINE | Facility: CLINIC | Age: 30
End: 2020-01-30

## 2020-01-30 NOTE — TELEPHONE ENCOUNTER
Called pt to reschedule MRI. Pt advised that she received a call and was told that the insurance company would not pay for MRI until after she has had x-rays. MRI appt was cancelled. Pt advised she would call back to schedule MRI once she has completed the x-rays. Pt also stated that she is interested in the epidural injection that was discussed at last appt. Verbalized understanding

## 2020-02-03 DIAGNOSIS — M54.6 PAIN IN THORACIC SPINE: ICD-10-CM

## 2020-02-03 DIAGNOSIS — M54.2 CERVICALGIA: Primary | ICD-10-CM

## 2020-02-04 ENCOUNTER — TELEPHONE (OUTPATIENT)
Dept: SPINE | Facility: CLINIC | Age: 30
End: 2020-02-04

## 2020-02-12 ENCOUNTER — TELEPHONE (OUTPATIENT)
Dept: FAMILY MEDICINE | Facility: CLINIC | Age: 30
End: 2020-02-12

## 2020-02-12 NOTE — TELEPHONE ENCOUNTER
Wants to know if she can get inpatient care for mental health because she is having trouble getting insurance coverage. Should she just go to the ER or is there another way to do this?

## 2020-02-12 NOTE — TELEPHONE ENCOUNTER
Debby Chapin, instructed me to send her to ER or schedule ov. I called patient and she states she does not think they will order all the testing she needs. I then offered to schedule an ov with you but she states she will think about it and call back.

## 2020-02-12 NOTE — TELEPHONE ENCOUNTER
Since she is not here, and the last time I saw her she was stable, I cannot make an official referral without knowing about her symptoms.  Please find out more info.  Also, please call [place] and find out what we need to do to have her admitted since she is not currently in the office for me to evaluate.

## 2020-02-13 ENCOUNTER — HOSPITAL ENCOUNTER (EMERGENCY)
Facility: HOSPITAL | Age: 30
Discharge: PSYCHIATRIC OR SUBSTANCE USE FACILITY | End: 2020-02-14
Attending: EMERGENCY MEDICINE
Payer: COMMERCIAL

## 2020-02-13 DIAGNOSIS — R45.851 SUICIDAL IDEATION: Primary | ICD-10-CM

## 2020-02-13 LAB
ALBUMIN SERPL BCP-MCNC: 3.9 G/DL (ref 3.5–5.2)
ALP SERPL-CCNC: 45 U/L (ref 55–135)
ALT SERPL W/O P-5'-P-CCNC: 20 U/L (ref 10–44)
AMPHET+METHAMPHET UR QL: NEGATIVE
ANION GAP SERPL CALC-SCNC: 9 MMOL/L (ref 8–16)
APAP SERPL-MCNC: <10 UG/ML (ref 10–20)
AST SERPL-CCNC: 20 U/L (ref 10–40)
B-HCG UR QL: NEGATIVE
BARBITURATES UR QL SCN>200 NG/ML: NEGATIVE
BASOPHILS # BLD AUTO: 0.04 K/UL (ref 0–0.2)
BASOPHILS NFR BLD: 0.3 % (ref 0–1.9)
BENZODIAZ UR QL SCN>200 NG/ML: NEGATIVE
BILIRUB SERPL-MCNC: 0.6 MG/DL (ref 0.1–1)
BUN SERPL-MCNC: 12 MG/DL (ref 6–20)
BZE UR QL SCN: NEGATIVE
CALCIUM SERPL-MCNC: 8.7 MG/DL (ref 8.7–10.5)
CANNABINOIDS UR QL SCN: NEGATIVE
CHLORIDE SERPL-SCNC: 109 MMOL/L (ref 95–110)
CO2 SERPL-SCNC: 19 MMOL/L (ref 23–29)
CREAT SERPL-MCNC: 1 MG/DL (ref 0.5–1.4)
CREAT UR-MCNC: 199 MG/DL (ref 15–325)
CTP QC/QA: YES
DIFFERENTIAL METHOD: ABNORMAL
EOSINOPHIL # BLD AUTO: 0.3 K/UL (ref 0–0.5)
EOSINOPHIL NFR BLD: 2.6 % (ref 0–8)
ERYTHROCYTE [DISTWIDTH] IN BLOOD BY AUTOMATED COUNT: 11.2 % (ref 11.5–14.5)
EST. GFR  (AFRICAN AMERICAN): >60 ML/MIN/1.73 M^2
EST. GFR  (NON AFRICAN AMERICAN): >60 ML/MIN/1.73 M^2
ETHANOL SERPL-MCNC: <5 MG/DL
GLUCOSE SERPL-MCNC: 83 MG/DL (ref 70–110)
HCT VFR BLD AUTO: 41.2 % (ref 37–48.5)
HGB BLD-MCNC: 14.1 G/DL (ref 12–16)
IMM GRANULOCYTES # BLD AUTO: 0.05 K/UL (ref 0–0.04)
IMM GRANULOCYTES NFR BLD AUTO: 0.4 % (ref 0–0.5)
LYMPHOCYTES # BLD AUTO: 3.2 K/UL (ref 1–4.8)
LYMPHOCYTES NFR BLD: 25.4 % (ref 18–48)
MCH RBC QN AUTO: 32.5 PG (ref 27–31)
MCHC RBC AUTO-ENTMCNC: 34.2 G/DL (ref 32–36)
MCV RBC AUTO: 95 FL (ref 82–98)
MONOCYTES # BLD AUTO: 0.8 K/UL (ref 0.3–1)
MONOCYTES NFR BLD: 6.7 % (ref 4–15)
NEUTROPHILS # BLD AUTO: 8.1 K/UL (ref 1.8–7.7)
NEUTROPHILS NFR BLD: 64.6 % (ref 38–73)
NRBC BLD-RTO: 0 /100 WBC
OPIATES UR QL SCN: NEGATIVE
PCP UR QL SCN>25 NG/ML: NEGATIVE
PLATELET # BLD AUTO: 240 K/UL (ref 150–350)
PMV BLD AUTO: 10.6 FL (ref 9.2–12.9)
POTASSIUM SERPL-SCNC: 3.4 MMOL/L (ref 3.5–5.1)
PROT SERPL-MCNC: 6.6 G/DL (ref 6–8.4)
RBC # BLD AUTO: 4.34 M/UL (ref 4–5.4)
SALICYLATES SERPL-MCNC: 7 MG/DL (ref 15–30)
SODIUM SERPL-SCNC: 137 MMOL/L (ref 136–145)
TOXICOLOGY INFORMATION: NORMAL
TSH SERPL DL<=0.005 MIU/L-ACNC: 3.14 UIU/ML (ref 0.34–5.6)
WBC # BLD AUTO: 12.48 K/UL (ref 3.9–12.7)

## 2020-02-13 PROCEDURE — 80307 DRUG TEST PRSMV CHEM ANLYZR: CPT

## 2020-02-13 PROCEDURE — 85025 COMPLETE CBC W/AUTO DIFF WBC: CPT

## 2020-02-13 PROCEDURE — 36415 COLL VENOUS BLD VENIPUNCTURE: CPT

## 2020-02-13 PROCEDURE — 81025 URINE PREGNANCY TEST: CPT | Performed by: EMERGENCY MEDICINE

## 2020-02-13 PROCEDURE — 80053 COMPREHEN METABOLIC PANEL: CPT

## 2020-02-13 PROCEDURE — 99285 EMERGENCY DEPT VISIT HI MDM: CPT

## 2020-02-13 PROCEDURE — 80320 DRUG SCREEN QUANTALCOHOLS: CPT

## 2020-02-13 PROCEDURE — 81003 URINALYSIS AUTO W/O SCOPE: CPT

## 2020-02-13 PROCEDURE — 84443 ASSAY THYROID STIM HORMONE: CPT

## 2020-02-13 RX ORDER — DIPHENHYDRAMINE HYDROCHLORIDE 50 MG/ML
50 INJECTION INTRAMUSCULAR; INTRAVENOUS
Status: DISCONTINUED | OUTPATIENT
Start: 2020-02-13 | End: 2020-02-13

## 2020-02-13 RX ORDER — LORAZEPAM 2 MG/ML
2 INJECTION INTRAMUSCULAR
Status: ACTIVE | OUTPATIENT
Start: 2020-02-13 | End: 2020-02-14

## 2020-02-13 RX ORDER — HALOPERIDOL 5 MG/ML
5 INJECTION INTRAMUSCULAR
Status: DISCONTINUED | OUTPATIENT
Start: 2020-02-13 | End: 2020-02-13

## 2020-02-14 VITALS
WEIGHT: 170 LBS | SYSTOLIC BLOOD PRESSURE: 110 MMHG | TEMPERATURE: 99 F | BODY MASS INDEX: 33.38 KG/M2 | DIASTOLIC BLOOD PRESSURE: 60 MMHG | RESPIRATION RATE: 18 BRPM | HEIGHT: 60 IN | HEART RATE: 75 BPM | OXYGEN SATURATION: 98 %

## 2020-02-14 PROCEDURE — 25000003 PHARM REV CODE 250: Performed by: EMERGENCY MEDICINE

## 2020-02-14 PROCEDURE — G0425 INPT/ED TELECONSULT30: HCPCS | Mod: GT,,, | Performed by: PSYCHIATRY & NEUROLOGY

## 2020-02-14 PROCEDURE — G0425 PR INPT TELEHEALTH CONSULT 30M: ICD-10-PCS | Mod: GT,,, | Performed by: PSYCHIATRY & NEUROLOGY

## 2020-02-14 RX ORDER — ACETAMINOPHEN 325 MG/1
650 TABLET ORAL
Status: COMPLETED | OUTPATIENT
Start: 2020-02-14 | End: 2020-02-14

## 2020-02-14 RX ADMIN — ACETAMINOPHEN 650 MG: 325 TABLET ORAL at 08:02

## 2020-02-14 NOTE — ED NOTES
Breathing even unlabored. Pt states no complaints of headache. Pt ambulated to wheelchair with a steady gait.

## 2020-02-14 NOTE — ED PROVIDER NOTES
"Encounter Date: 2020       History     Chief Complaint   Patient presents with    Suicidal     "no plan" reports hx of PTSD / recent hx of "overdosed on xanax" @ [facility]     29-year-old female with history of PTSD, depression with previous suicide attempt, not currently on medication, presents for evaluation of suicidal ideation.  She says she has no plan but has been visualizing herself overdosing.  She has not taken any medications today other than those prescribed to her.  No hallucinations or homicidal ideation.  No other acute issues at this time        Review of patient's allergies indicates:   Allergen Reactions    Percocet [oxycodone-acetaminophen] Rash    Lamictal [lamotrigine] Rash     Past Medical History:   Diagnosis Date    Bursitis, trochanteric     Diffuse arthralgia     Fibromyalgia     Heavy tobacco smoker     Pain in lower back     Pinched nerve in neck     Plantar fasciitis, bilateral     PTSD (post-traumatic stress disorder)     PTSD (post-traumatic stress disorder)     PTSD (post-traumatic stress disorder)     Seizures      Past Surgical History:   Procedure Laterality Date     SECTION  2011    FOOT SURGERY Right     2    LAPAROTOMY, EXPLORATORY      OOPHORECTOMY       Family History   Problem Relation Age of Onset    No Known Problems Mother     Seizures Father      Social History     Tobacco Use    Smoking status: Current Every Day Smoker    Smokeless tobacco: Never Used   Substance Use Topics    Alcohol use: No    Drug use: No     Review of Systems   Constitutional: Negative for fever.   HENT: Negative for sore throat.    Respiratory: Negative for shortness of breath.    Cardiovascular: Negative for chest pain.   Gastrointestinal: Negative for nausea.   Genitourinary: Negative for dysuria.   Musculoskeletal: Negative for back pain.   Skin: Negative for rash.   Neurological: Negative for weakness.   Hematological: Does not bruise/bleed easily. "   Psychiatric/Behavioral: Positive for suicidal ideas.       Physical Exam     Initial Vitals [02/13/20 2146]   BP Pulse Resp Temp SpO2   (!) 165/70 88 14 98.1 °F (36.7 °C) 99 %      MAP       --         Physical Exam    Nursing note and vitals reviewed.  Constitutional: She appears well-developed and well-nourished. She is not diaphoretic. No distress.   HENT:   Head: Normocephalic and atraumatic.   Mouth/Throat: Oropharynx is clear and moist.   Eyes: Conjunctivae and EOM are normal.   Neck: Normal range of motion. Neck supple.   Cardiovascular: Normal rate, regular rhythm, normal heart sounds and intact distal pulses.   Pulmonary/Chest: Breath sounds normal. No respiratory distress. She has no wheezes. She has no rhonchi. She has no rales.   Abdominal: Soft. Bowel sounds are normal. She exhibits no distension. There is no tenderness.   Musculoskeletal: Normal range of motion. She exhibits no edema or tenderness.   Neurological: She is alert and oriented to person, place, and time. She has normal strength.   Skin: Skin is warm and dry. Capillary refill takes less than 2 seconds.   Psychiatric: She has a normal mood and affect. Her speech is normal. Cognition and memory are normal. She expresses suicidal ideation.         ED Course   Procedures  Labs Reviewed   CBC W/ AUTO DIFFERENTIAL - Abnormal; Notable for the following components:       Result Value    Mean Corpuscular Hemoglobin 32.5 (*)     RDW 11.2 (*)     Gran # (ANC) 8.1 (*)     Immature Grans (Abs) 0.05 (*)     All other components within normal limits   COMPREHENSIVE METABOLIC PANEL - Abnormal; Notable for the following components:    Potassium 3.4 (*)     CO2 19 (*)     Alkaline Phosphatase 45 (*)     All other components within normal limits   SALICYLATE LEVEL - Abnormal; Notable for the following components:    Salicylate Lvl 7.0 (*)     All other components within normal limits   TSH   DRUG SCREEN PANEL, URINE EMERGENCY    Narrative:     Specimen  Source->Urine   ALCOHOL,MEDICAL (ETHANOL)   ACETAMINOPHEN LEVEL   URINALYSIS, REFLEX TO URINE CULTURE   POCT URINE PREGNANCY          Imaging Results    None          Medical Decision Making:   Initial Assessment:   29-year-old female with reported history of PTSD, depression with history of attempt at overdose presents for evaluation of suicidal ideation.  She states she has no plan but has been visualizing herself overdosing.  She is not currently on any medications for depression.  No homicidal ideation or hallucinations.  Pec placed, screening psychiatric labs obtained and are unremarkable.  She is medically clear for psychiatric evaluation.  Tele psychiatry has been consulted  Differential Diagnosis:   Depression, alcohol use, drug use, noncompliance, thyroid dysfunction and others  Clinical Tests:   Lab Tests: Ordered and Reviewed  The following lab test(s) were unremarkable: CBC, CMP, UPT and Urinalysis                                 Clinical Impression:       ICD-10-CM ICD-9-CM   1. Suicidal ideation R45.851 V62.84         Disposition:   Disposition: Transferred  Condition: Stable                     Nidhi Downs MD  02/14/20 0211       Nidhi Downs MD  02/14/20 0211

## 2020-02-14 NOTE — CONSULTS
"[facility]  Psychiatry  Telepsychiatry Consult Note    Please see previous notes:  Patient agreeable to consultation via telepsychiatry.  Tele-Consultation from Psychiatry started: 2/14/2020 at 0040  The chief complaint leading to psychiatric consultation is: SI  This consultation was requested by dr brannon, the Emergency Department attending physician.  The location of the consulting psychiatrist is [address] [place] [address].  The patient location is  [facility] EMERGENCY DEPARTMENT   The patient arrived at the ED at: 2100    Also present with the patient at the time of the consultation: ed tech  Patient Identification:   Kiki Molina is a 29 y.o. female.  Patient information was obtained from patient and past medical records.  Patient presented voluntarily to the Emergency Department by ambulance where the patient received see Ambulance Run Sheet prior to arrival.    Consults  Subjective:     History of Present Illness: This is a 30 y/o WF that presented to the ED earlier this morning 2/2 "29-year-old female with history of PTSD, depression with previous suicide attempt, not currently on medication, presents for evaluation of suicidal ideation.  She says she has no plan but has been visualizing herself overdosing.  She has not taken any medications today other than those prescribed to her.  No hallucinations or homicidal ideation.  No other acute issues at this time". On exam, the pt gives a hx of 2 past suicide attempts and 2 past psychiatric hospitalizations. Her last attempt was with xanax, after a fellow  committed suicide. Report, she dixon not currently follow with psychiatry and is not on medication management. [facility]. Denies [facility]OSIANE. Lives with her father and her 8 year old daughter. Works as a mental health tech. Good insight. +ve SI -ve HI, -ve AVH.       Psychiatric Mental Status Exam:  Arousal: alert  Sensorium/Orientation: oriented to " "grossly intact  Behavior/Cooperation: normal, cooperative   Speech: normal tone, normal rate, normal pitch, normal volume  Language: grossly intact  Mood: " ok "   Affect: appropriate  Thought Process: normal and logical  Thought Content:   Auditory hallucinations: NO  Visual hallucinations: NO  Paranoia: NO  Delusions:  NO  Suicidal ideation: YES:      Homicidal ideation: NO  Insight: has awareness of illness  Judgment: behavior is adequate to circumstances      Past Medical History:   Past Medical History:   Diagnosis Date    Bursitis, trochanteric     Diffuse arthralgia     Fibromyalgia     Heavy tobacco smoker     Pain in lower back     Pinched nerve in neck     Plantar fasciitis, bilateral     PTSD (post-traumatic stress disorder)     PTSD (post-traumatic stress disorder)     PTSD (post-traumatic stress disorder)     Seizures       Laboratory Data:   Labs Reviewed   CBC W/ AUTO DIFFERENTIAL - Abnormal; Notable for the following components:       Result Value    Mean Corpuscular Hemoglobin 32.5 (*)     RDW 11.2 (*)     Gran # (ANC) 8.1 (*)     Immature Grans (Abs) 0.05 (*)     All other components within normal limits   COMPREHENSIVE METABOLIC PANEL - Abnormal; Notable for the following components:    Potassium 3.4 (*)     CO2 19 (*)     Alkaline Phosphatase 45 (*)     All other components within normal limits   SALICYLATE LEVEL - Abnormal; Notable for the following components:    Salicylate Lvl 7.0 (*)     All other components within normal limits   TSH   DRUG SCREEN PANEL, URINE EMERGENCY    Narrative:     Specimen Source->Urine   ALCOHOL,MEDICAL (ETHANOL)   ACETAMINOPHEN LEVEL   URINALYSIS, REFLEX TO URINE CULTURE   POCT URINE PREGNANCY     Review of patient's allergies indicates:   Allergen Reactions    Percocet [oxycodone-acetaminophen] Rash    Lamictal [lamotrigine] Rash       Medications in ER:   Medications   lorazepam injection 2 mg (2 mg Intramuscular Not Given 2/13/20 3427) "     Medications at home: none at this time  No new subjective & objective note has been filed under this hospital service since the last note was generated.      Assessment - Diagnosis - Goals:     Diagnosis/Impression:   - PTSD  - Depressive Disorder Unspecified    Rec:   - PEC  - Transfer to inpt psychiatric unit      Time with patient: 30 min  More than 50% of the time was spent counseling/coordinating care  Consulting clinician was informed of the encounter and consult note.  Consultation ended: 2/14/2020 at 0110    Edwardo Gibbs MD, O   Psychiatry  [facility]

## 2020-02-14 NOTE — ED NOTES
Pt is currently calm and cooperative. States she has been depressed for a week and felt suicidal. No thoughts of SI or HI at this time.

## 2020-02-21 LAB
BILIRUB UR QL STRIP: NEGATIVE
CLARITY UR: CLEAR
COLOR UR: YELLOW
GLUCOSE UR QL STRIP: NEGATIVE
HGB UR QL STRIP: NEGATIVE
KETONES UR QL STRIP: ABNORMAL
LEUKOCYTE ESTERASE UR QL STRIP: NEGATIVE
NITRITE UR QL STRIP: NEGATIVE
PH UR STRIP: 5 [PH] (ref 5–8)
PROT UR QL STRIP: NEGATIVE
SP GR UR STRIP: 1.02 (ref 1–1.03)
URN SPEC COLLECT METH UR: ABNORMAL
UROBILINOGEN UR STRIP-ACNC: NEGATIVE EU/DL

## 2020-06-15 ENCOUNTER — OFFICE VISIT (OUTPATIENT)
Dept: FAMILY MEDICINE | Facility: CLINIC | Age: 30
End: 2020-06-15
Payer: COMMERCIAL

## 2020-06-15 VITALS
BODY MASS INDEX: 33.18 KG/M2 | SYSTOLIC BLOOD PRESSURE: 100 MMHG | TEMPERATURE: 99 F | HEART RATE: 80 BPM | HEIGHT: 60 IN | DIASTOLIC BLOOD PRESSURE: 78 MMHG | OXYGEN SATURATION: 99 % | WEIGHT: 169 LBS

## 2020-06-15 DIAGNOSIS — M79.7 FIBROMYALGIA: Primary | ICD-10-CM

## 2020-06-15 PROCEDURE — 99213 PR OFFICE/OUTPT VISIT, EST, LEVL III, 20-29 MIN: ICD-10-PCS | Mod: S$PBB,,, | Performed by: NURSE PRACTITIONER

## 2020-06-15 PROCEDURE — 99213 OFFICE O/P EST LOW 20 MIN: CPT | Mod: S$PBB,,, | Performed by: NURSE PRACTITIONER

## 2020-06-15 PROCEDURE — 99215 OFFICE O/P EST HI 40 MIN: CPT | Performed by: NURSE PRACTITIONER

## 2020-06-15 RX ORDER — PAROXETINE HYDROCHLORIDE HEMIHYDRATE 25 MG/1
TABLET, FILM COATED, EXTENDED RELEASE ORAL
COMMUNITY
Start: 2020-05-29 | End: 2021-08-09

## 2020-06-15 RX ORDER — HYDROXYZINE PAMOATE 50 MG/1
CAPSULE ORAL
COMMUNITY
Start: 2020-05-29 | End: 2021-08-09

## 2020-06-15 RX ORDER — GABAPENTIN 300 MG/1
300 CAPSULE ORAL NIGHTLY
Qty: 30 CAPSULE | Refills: 1 | Status: SHIPPED | OUTPATIENT
Start: 2020-06-15 | End: 2021-08-09

## 2020-06-15 RX ORDER — NAPROXEN 500 MG/1
500 TABLET ORAL 2 TIMES DAILY
Qty: 28 TABLET | Refills: 0 | Status: SHIPPED | OUTPATIENT
Start: 2020-06-15 | End: 2020-06-29

## 2020-06-15 RX ORDER — PRAZOSIN HYDROCHLORIDE 2 MG/1
CAPSULE ORAL
COMMUNITY
Start: 2020-05-27 | End: 2021-08-09

## 2020-06-15 RX ORDER — PRAZOSIN HYDROCHLORIDE 1 MG/1
CAPSULE ORAL
COMMUNITY
Start: 2020-05-29 | End: 2021-08-09

## 2020-06-15 NOTE — PROGRESS NOTES
SUBJECTIVE:      Patient ID: Kiki Molina is a 29 y.o. female.    Chief Complaint: Pain (Severe wide spread pain, Wants to discuss RSD )    Kiki presents to the clinic with complaints of pain to her neck, back, legs, and feet.  She reports these areas cause her chronic pain, however they have become worse over the past 3 weeks.  She describes the pain as burning/tingling.  She has a history of multiple orthopedic fractures.  She has been previously worked up by a rheumatologist.  She reports her GRAHAM and rheumatoid factor were negative.  She was diagnosed with fibromyalgia at that time.  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  She has tried physical therapy, massage therapy, visited the chiropractor, and dry needling without success.  She denies any new injury or trauma.  She sees Dr. Wayne with Neurology for her seizures and follows up every 6 months.      Pain  This is a chronic problem. The current episode started more than 1 month ago. The problem occurs constantly. The problem has been waxing and waning. Associated symptoms include joint swelling and myalgias. Pertinent negatives include no abdominal pain, chest pain, chills, congestion, coughing, fatigue, fever, headaches, nausea, numbness, rash, sore throat, swollen glands, vomiting or weakness. The symptoms are aggravated by walking, bending, standing, twisting and exertion. She has tried rest for the symptoms. The treatment provided no relief.       Family History   Problem Relation Age of Onset    No Known Problems Mother     Seizures Father       Social History     Socioeconomic History    Marital status: Single     Spouse name: Not on file    Number of children: Not on file    Years of education: Not on file    Highest education level: Not on file   Occupational History    Not on file   Social Needs    Financial resource strain: Not  on file    Food insecurity     Worry: Not on file     Inability: Not on file    Transportation needs     Medical: Not on file     Non-medical: Not on file   Tobacco Use    Smoking status: Current Every Day Smoker    Smokeless tobacco: Never Used   Substance and Sexual Activity    Alcohol use: No    Drug use: No    Sexual activity: Not on file   Lifestyle    Physical activity     Days per week: Not on file     Minutes per session: Not on file    Stress: Not on file   Relationships    Social connections     Talks on phone: Not on file     Gets together: Not on file     Attends [employer] service: Not on file     Active member of club or organization: Not on file     Attends meetings of clubs or organizations: Not on file     Relationship status: Not on file   Other Topics Concern    Not on file   Social History Narrative    Not on file     Current Outpatient Medications   Medication Sig Dispense Refill    hydrOXYzine pamoate (VISTARIL) 50 MG Cap TK ONE C PO  BID      levETIRAcetam (KEPPRA) 1000 MG tablet TAKE 1 AND 1/2 TABLETS BY MOUTH TWICE DAILY 120 tablet 0    paroxetine (PAXIL-CR) 25 MG 24 hr tablet TK 2 TS PO QAM      prazosin (MINIPRESS) 1 MG Cap       prazosin (MINIPRESS) 2 MG Cap TK  2 CS PO QHS      spironolactone (ALDACTONE) 25 MG tablet       gabapentin (NEURONTIN) 300 MG capsule Take 1 capsule (300 mg total) by mouth every evening. 30 capsule 1    naproxen (EC NAPROSYN) 500 MG EC tablet Take 1 tablet (500 mg total) by mouth 2 (two) times daily. for 14 days 28 tablet 0     No current facility-administered medications for this visit.      Review of patient's allergies indicates:   Allergen Reactions    Percocet [oxycodone-acetaminophen] Rash    Lamictal [lamotrigine] Rash      Past Medical History:   Diagnosis Date    Bursitis, trochanteric     Diffuse arthralgia     Fibromyalgia     Heavy tobacco smoker     Pain in lower back     Pinched nerve in neck     Plantar fasciitis,  bilateral     PTSD (post-traumatic stress disorder)     PTSD (post-traumatic stress disorder)     PTSD (post-traumatic stress disorder)     Seizures      Past Surgical History:   Procedure Laterality Date     SECTION  2011    FOOT SURGERY Right     2    LAPAROTOMY, EXPLORATORY      OOPHORECTOMY         Review of Systems   Constitutional: Negative for activity change, chills, fatigue, fever and unexpected weight change.   HENT: Negative for congestion, ear pain, sinus pressure, sore throat, trouble swallowing and voice change.    Eyes: Negative for pain, discharge and visual disturbance.   Respiratory: Negative for cough, chest tightness, shortness of breath and wheezing.    Cardiovascular: Negative for chest pain and palpitations.   Gastrointestinal: Negative for abdominal pain, constipation, diarrhea, nausea and vomiting.   Genitourinary: Negative for difficulty urinating, flank pain, frequency and urgency.   Musculoskeletal: Positive for back pain, joint swelling and myalgias.        Joint pain, ankle pain, feet pain   Skin: Negative for color change and rash.   Neurological: Negative for dizziness, seizures, syncope, weakness, numbness and headaches.   Hematological: Negative for adenopathy.   Psychiatric/Behavioral: Negative for dysphoric mood and sleep disturbance. The patient is not nervous/anxious.       OBJECTIVE:      Vitals:    06/15/20 0848   BP: 100/78   BP Location: Right arm   Patient Position: Sitting   BP Method: Large (Manual)   Pulse: 80   Temp: 98.6 °F (37 °C)   SpO2: 99%   Weight: 76.7 kg (169 lb)   Height: 5' (1.524 m)     Physical Exam  Vitals signs and nursing note reviewed.   Constitutional:       General: She is not in acute distress.     Appearance: She is well-developed. She is not toxic-appearing or diaphoretic.   HENT:      Head: Normocephalic and atraumatic.      Right Ear: Tympanic membrane and external ear normal.      Left Ear: Tympanic membrane and external ear  normal.      Nose: Nose normal.      Mouth/Throat:      Pharynx: Uvula midline. No oropharyngeal exudate.   Eyes:      General: Lids are normal. No scleral icterus.        Right eye: No discharge.         Left eye: No discharge.      Conjunctiva/sclera: Conjunctivae normal.      Pupils: Pupils are equal, round, and reactive to light.   Neck:      Musculoskeletal: Normal range of motion and neck supple.      Thyroid: No thyromegaly.      Vascular: No carotid bruit.      Trachea: Trachea normal.   Cardiovascular:      Rate and Rhythm: Normal rate and regular rhythm.      Heart sounds: Normal heart sounds. No murmur. No friction rub.   Pulmonary:      Effort: Pulmonary effort is normal. No respiratory distress.      Breath sounds: Normal breath sounds. No stridor. No wheezing, rhonchi or rales.   Abdominal:      General: Bowel sounds are normal. There is no distension.      Palpations: Abdomen is soft.      Tenderness: There is no abdominal tenderness.   Musculoskeletal: Normal range of motion.      Right hip: Normal.      Left hip: Normal.      Right ankle: She exhibits no swelling.      Left ankle: She exhibits no swelling.      Lumbar back: She exhibits tenderness. She exhibits no swelling.      Right hand: Normal. She exhibits no swelling.      Left hand: Normal. She exhibits no swelling.      Comments: She reports pain to her hips, bilateral feet, bilateral ankles, and lower back.  No one particular part hurts more than the other.     Lymphadenopathy:      Cervical: No cervical adenopathy.   Skin:     General: Skin is warm and dry.      Capillary Refill: Capillary refill takes less than 2 seconds.      Findings: No erythema or rash.   Neurological:      General: No focal deficit present.      Mental Status: She is alert and oriented to person, place, and time.      GCS: GCS eye subscore is 4. GCS verbal subscore is 5. GCS motor subscore is 6.   Psychiatric:         Attention and Perception: Attention and  perception normal.         Mood and Affect: Affect is flat.         Speech: Speech normal.         Behavior: Behavior normal. Behavior is cooperative.         Thought Content: Thought content normal. Thought content does not include suicidal plan.         Judgment: Judgment normal.        Assessment:       1. Fibromyalgia        Plan:       Fibromyalgia   Patient is currently self pay but will have blue cross blue shield insurance at the end of the month.  She has tried physical therapy, massage therapy, gone to the chiropractor, and try dry needling with no improvement.  Differential diagnosis includes complex regional pain syndrome.  Referral to pain management provided.  Will restart her gabapentin.  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  Side effects of new medication discussed with patient; understanding voiced.  -     naproxen (EC NAPROSYN) 500 MG EC tablet; Take 1 tablet (500 mg total) by mouth 2 (two) times daily. for 14 days  Dispense: 28 tablet; Refill: 0  -     gabapentin (NEURONTIN) 300 MG capsule; Take 1 capsule (300 mg total) by mouth every evening.  Dispense: 30 capsule; Refill: 1  -     Ambulatory referral/consult to Pain Clinic; Future; Expected date: 06/22/2020        Follow up in 1 month (on 7/15/2020), or if symptoms worsen or fail to improve.      6/15/2020 TERRI Hollingsworth, AMBERP

## 2020-06-15 NOTE — PATIENT INSTRUCTIONS
Fibromyalgia  Fibromyalgia is a chronic condition. It causes pain and tenderness in connective tissues. This causes muscle pain. Often, there are also many tender areas throughout the body. Symptoms may also include stiffness and feelings of numbness and tingling. Symptoms may be worse upon waking up. They may increase with poor sleep, heavy activity, cold or damp weather, anxiety, or stress.  People with fibromyalgia often feel tired. They may have trouble sleeping. Other symptoms include morning stiffness, headaches, and painful menstrual periods. Some people have problems with thinking clearly and changes in memory.  The cause of fibromyalgia is not known. Symptoms are similar to that of other diseases. These include rheumatoid arthritis, low thyroid, chronic fatigue syndrome, and Lyme disease. In some cases, these diseases may occur together.  Fibromyalgia is often treated with medicines. You and your healthcare provider can discuss the medicine that may work best for you. You may have to try more than one medicine or combination of medicines before you find what works for you.  Home care  · If your healthcare provider has prescribed or recommended medicines, take them as directed.  · Rest as needed. Try to get enough sleep. If you have trouble sleeping, discuss this with your healthcare provider.   · Be active. Regular exercise can help manage symptoms. Some options include walking, swimming, and biking. Strengthening exercises may also be helpful. Talk to your healthcare provider about the best ways to be active.  · Follow a healthy diet. Limit caffeine and alcohol. If you smoke, ask your healthcare provider for help to stop.  · Notice how your body reacts to stress. Learn to listen to your body signals. This will help you take action before the stress becomes severe.  · Learn relaxation techniques. Also consider joining a stress reduction program or class.  · Talk to your healthcare provider about trying  complementary treatments. These include acupuncture, hypnosis, and biofeedback. Yoga and noreen chi may be helpful.  · Ask your healthcare provider about cognitive behavioral therapy (CBT). This type of counseling can help people with fibromyalgia cope better with their illness.  Follow-up care  Follow up with your healthcare provider or as advised by our staff. In many cases, fibromyalgia is best treated with a team approach.  This may involve your primary care provider, a rheumatologist, a physical therapist, and a mental health professional.   For more information: National [place] of Arthritis and Musculoskeletal and Skin Diseases (NIAMS)  www.niams.nih.gov 796-632-5873  When to seek medical advice  Contact your healthcare provider right away if any of these occur:  · Symptoms getting worse or new symptoms developing  · You feel hopeless, helpless, or lose interest in day-to-day life  Date Last Reviewed: 3/1/2017  © 2605-1333 [employer]. [address] [place] [address], [place] [address]. All rights reserved. This information is not intended as a substitute for professional medical care. Always follow your healthcare professional's instructions.

## 2020-08-01 ENCOUNTER — HOSPITAL ENCOUNTER (EMERGENCY)
Facility: HOSPITAL | Age: 30
Discharge: HOME | End: 2020-08-01
Attending: EMERGENCY MEDICINE
Payer: COMMERCIAL

## 2020-08-01 VITALS
WEIGHT: 160 LBS | HEIGHT: 60 IN | BODY MASS INDEX: 31.41 KG/M2 | SYSTOLIC BLOOD PRESSURE: 128 MMHG | OXYGEN SATURATION: 99 % | TEMPERATURE: 98 F | HEART RATE: 89 BPM | RESPIRATION RATE: 18 BRPM | DIASTOLIC BLOOD PRESSURE: 82 MMHG

## 2020-08-01 DIAGNOSIS — S05.02XA ABRASION OF LEFT CORNEA, INITIAL ENCOUNTER: Primary | ICD-10-CM

## 2020-08-01 PROCEDURE — 25000003 PHARM REV CODE 250: Performed by: EMERGENCY MEDICINE

## 2020-08-01 PROCEDURE — 99283 EMERGENCY DEPT VISIT LOW MDM: CPT

## 2020-08-01 PROCEDURE — 25000003 PHARM REV CODE 250

## 2020-08-01 RX ORDER — ERYTHROMYCIN 5 MG/G
OINTMENT OPHTHALMIC
Qty: 1 TUBE | Refills: 0 | Status: SHIPPED | OUTPATIENT
Start: 2020-08-01 | End: 2021-08-09

## 2020-08-01 RX ORDER — TETRACAINE HYDROCHLORIDE 5 MG/ML
2 SOLUTION OPHTHALMIC
Status: COMPLETED | OUTPATIENT
Start: 2020-08-01 | End: 2020-08-01

## 2020-08-01 RX ADMIN — FLUORESCEIN SODIUM 1 EACH: 1 STRIP OPHTHALMIC at 07:08

## 2020-08-01 RX ADMIN — TETRACAINE HYDROCHLORIDE 2 DROP: 5 SOLUTION OPHTHALMIC at 07:08

## 2020-08-01 NOTE — ED NOTES
Patient reports injury to L eye 1 month ago. Patient reports no other complaints.       LOC: The patient is awake, alert and aware of environment with an appropriate affect, the patient is oriented x 3 and speaking appropriately.  APPEARANCE: Patient resting comfortably and in no acute distress, patient is clean and well groomed, patient's clothing properly fastened.  SKIN: The skin is warm and dry, color consistent with ethnicity, patient has normal skin turgor and moist mucus membranes, skin intact, no breakdown or brusing noted.  MUSKULOSKELETAL: Patient moving all extremities well, no obvious swelling or deformities noted.  RESPIRATORY: Airway is open and patent, respirations are spontaneous, patient has a normal effort and rate, no accessory muscle use noted.  CARDIAC: Patient has a normal rate and rhythm, no peripheral edema noted, capillary refill < 3 seconds.  ABDOMEN: Soft and non tender to palpation, no distention noted.  NEUROLOGIC: PERRL, 3mm bilaterally, eyes open spontaneously, behavior appropriate to situation, follows commands, facial expression symmetrical, bilateral hand grasp equal and even, purposeful motor response noted, normal sensation in all extremities when touched with a finger.

## 2020-08-01 NOTE — ED PROVIDER NOTES
Encounter Date: 2020       History     Chief Complaint   Patient presents with    Eye Problem     INJURY TO LEFT EYE 1 MOS AGO     30-year-old female presents with pain to the left eye and redness patient reports that her eye was injured by a plastic toy 3 weeks ago, patient reports that she took a unknown I medication which was in drop form that had been prescribed to her sister for a week or 2 and the redness improved however when she stopped using this medication the redness reoccurred.  She is not sure what medication she was taking.  Patient reports no decreased visual acuity patient reports no fever no double vision no other acute complaints.        Review of patient's allergies indicates:   Allergen Reactions    Percocet [oxycodone-acetaminophen] Rash    Oxycodone Hives    Lamotrigine Rash and Hives     Past Medical History:   Diagnosis Date    Bursitis, trochanteric     Diffuse arthralgia     Fibromyalgia     Heavy tobacco smoker     Pain in lower back     Pinched nerve in neck     Plantar fasciitis, bilateral     PTSD (post-traumatic stress disorder)     PTSD (post-traumatic stress disorder)     PTSD (post-traumatic stress disorder)     Seizures      Past Surgical History:   Procedure Laterality Date     SECTION  2011    FOOT SURGERY Right     2    LAPAROTOMY, EXPLORATORY      OOPHORECTOMY       Family History   Problem Relation Age of Onset    No Known Problems Mother     Seizures Father      Social History     Tobacco Use    Smoking status: Current Every Day Smoker    Smokeless tobacco: Never Used   Substance Use Topics    Alcohol use: No    Drug use: No     Review of Systems   Constitutional: Negative for fever.   HENT: Negative for congestion, rhinorrhea, sore throat and trouble swallowing.    Eyes: Positive for redness. Negative for visual disturbance.   Respiratory: Negative for cough, chest tightness, shortness of breath and wheezing.    Cardiovascular: Negative  for chest pain, palpitations and leg swelling.   Gastrointestinal: Negative for abdominal distention, abdominal pain, constipation, diarrhea, nausea and vomiting.   Genitourinary: Negative for difficulty urinating, dysuria, flank pain and frequency.   Musculoskeletal: Negative for arthralgias, back pain, joint swelling and neck pain.   Skin: Negative for color change and rash.   Neurological: Negative for dizziness, syncope, speech difficulty, weakness, numbness and headaches.   All other systems reviewed and are negative.      Physical Exam     Initial Vitals [08/01/20 0738]   BP Pulse Resp Temp SpO2   127/82 88 16 98.1 °F (36.7 °C) 99 %      MAP       --         Physical Exam    Nursing note and vitals reviewed.  Constitutional: She appears well-developed and well-nourished. She is not diaphoretic. No distress.   HENT:   Head: Normocephalic and atraumatic.   Right Ear: External ear normal.   Left Ear: External ear normal.   Nose: Nose normal.   Mouth/Throat: Oropharynx is clear and moist. No oropharyngeal exudate.   Eyes: EOM are normal. Pupils are equal, round, and reactive to light. Right eye exhibits no discharge. Left eye exhibits discharge. No scleral icterus.   Patient has left-sided scleral injection with mild discharge, patient has intact extraocular motions, patient has no sign of foreign bodies on physical exam   Neck: Normal range of motion. Neck supple. No thyromegaly present. No tracheal deviation present. No JVD present.   Cardiovascular: Normal rate, regular rhythm, normal heart sounds and intact distal pulses. Exam reveals no gallop and no friction rub.    No murmur heard.  Pulmonary/Chest: Breath sounds normal. No stridor. No respiratory distress. She has no wheezes. She has no rhonchi. She has no rales. She exhibits no tenderness.   Abdominal: Soft. Bowel sounds are normal. She exhibits no distension and no mass. There is no abdominal tenderness. There is no rebound and no guarding.    Musculoskeletal: Normal range of motion. No tenderness or edema.   Lymphadenopathy:     She has no cervical adenopathy.   Neurological: She is alert and oriented to person, place, and time. She has normal strength. She displays normal reflexes. No cranial nerve deficit or sensory deficit.   Skin: Skin is warm and dry. No rash and no abscess noted. No erythema. No pallor.         ED Course   Procedures  Labs Reviewed - No data to display       Imaging Results    None          Medical Decision Making:   History:   Old Medical Records: I decided to obtain old medical records.  Initial Assessment:   Emergent evaluation of a 30-year-old female presenting with left eye redness and discharge              Attending Attestation:             Attending ED Notes:   Patient is 20/30 bilaterally to bedside exam.  Fluorescein exam with slit lamp performed, patient has dye uptake to a mild peripheral abrasion noted at 5:00, there is also some scleral uptake to the left, I do not visualize any foreign body at this time, patient has no sign of cell or flare no hypopyon patient has negative Gilbert sign, patient will be started on a course of erythromycin eye ointment but she is told she will need to see an ophthalmologist for definitive care given that her symptoms have persisted despite previous treatment even if they were self initiated.  Patient to follow-up with ophthalmology in the next 2-3 days and is cautioned to return immediately to the emergency department for any worsening or for any further concerns.                        Clinical Impression:       ICD-10-CM ICD-9-CM   1. Abrasion of left cornea, initial encounter  S05.02XA 918.1             ED Disposition Condition    Discharge Stable        ED Prescriptions     Medication Sig Dispense Start Date End Date Auth. Provider    erythromycin (ROMYCIN) ophthalmic ointment Place a 1/2 inch ribbon of ointment into the lower eyelid. 1 Tube 8/1/2020  Duglas Camacho MD         Follow-up Information     Follow up With Specialties Details Why Contact Info Additional Information    Bebeto Casey MD Ophthalmology Schedule an appointment as soon as possible for a visit in 2 days  [address] [facility] [address]  [phone]       [facility] Emergency Medicine  If symptoms worsen [address] [facility] [id number]  [phone] 1st floor                                     Duglas Camacho MD  08/01/20 8696

## 2020-11-18 ENCOUNTER — HOSPITAL ENCOUNTER (EMERGENCY)
Facility: HOSPITAL | Age: 30
Discharge: HOME | End: 2020-11-19
Attending: EMERGENCY MEDICINE

## 2020-11-18 DIAGNOSIS — L03.211 FACIAL CELLULITIS: ICD-10-CM

## 2020-11-18 DIAGNOSIS — K11.20 PAROTITIS: ICD-10-CM

## 2020-11-18 DIAGNOSIS — L01.00 IMPETIGO: Primary | ICD-10-CM

## 2020-11-18 LAB
B-HCG UR QL: NEGATIVE
CREAT SERPL-MCNC: 0.9 MG/DL (ref 0.5–1.4)
CTP QC/QA: YES
SAMPLE: NORMAL

## 2020-11-18 PROCEDURE — 25500020 PHARM REV CODE 255: Performed by: EMERGENCY MEDICINE

## 2020-11-18 PROCEDURE — 99284 EMERGENCY DEPT VISIT MOD MDM: CPT | Mod: 25

## 2020-11-18 PROCEDURE — 25000003 PHARM REV CODE 250: Performed by: EMERGENCY MEDICINE

## 2020-11-18 PROCEDURE — 81025 URINE PREGNANCY TEST: CPT | Performed by: EMERGENCY MEDICINE

## 2020-11-18 PROCEDURE — 63600175 PHARM REV CODE 636 W HCPCS: Performed by: EMERGENCY MEDICINE

## 2020-11-18 PROCEDURE — 96374 THER/PROPH/DIAG INJ IV PUSH: CPT

## 2020-11-18 RX ORDER — MUPIROCIN 20 MG/G
1 OINTMENT TOPICAL
Status: COMPLETED | OUTPATIENT
Start: 2020-11-18 | End: 2020-11-18

## 2020-11-18 RX ORDER — DOXYCYCLINE 100 MG/1
100 CAPSULE ORAL EVERY 12 HOURS
Status: DISCONTINUED | OUTPATIENT
Start: 2020-11-18 | End: 2020-11-19 | Stop reason: HOSPADM

## 2020-11-18 RX ORDER — PROCHLORPERAZINE EDISYLATE 5 MG/ML
10 INJECTION INTRAMUSCULAR; INTRAVENOUS
Status: COMPLETED | OUTPATIENT
Start: 2020-11-18 | End: 2020-11-18

## 2020-11-18 RX ORDER — PREDNISONE 20 MG/1
40 TABLET ORAL
Status: COMPLETED | OUTPATIENT
Start: 2020-11-18 | End: 2020-11-18

## 2020-11-18 RX ORDER — BUTALBITAL, ACETAMINOPHEN AND CAFFEINE 50; 325; 40 MG/1; MG/1; MG/1
1 TABLET ORAL EVERY 4 HOURS PRN
Status: DISCONTINUED | OUTPATIENT
Start: 2020-11-18 | End: 2020-11-19 | Stop reason: HOSPADM

## 2020-11-18 RX ORDER — DIPHENHYDRAMINE HCL 25 MG
25 CAPSULE ORAL
Status: COMPLETED | OUTPATIENT
Start: 2020-11-18 | End: 2020-11-18

## 2020-11-18 RX ADMIN — PREDNISONE 40 MG: 20 TABLET ORAL at 08:11

## 2020-11-18 RX ADMIN — BUTALBITAL, ACETAMINOPHEN AND CAFFEINE 1 TABLET: 50; 325; 40 TABLET ORAL at 08:11

## 2020-11-18 RX ADMIN — DOXYCYCLINE HYCLATE 100 MG: 100 CAPSULE ORAL at 09:11

## 2020-11-18 RX ADMIN — PROCHLORPERAZINE EDISYLATE 10 MG: 5 INJECTION INTRAMUSCULAR; INTRAVENOUS at 10:11

## 2020-11-18 RX ADMIN — IOHEXOL 100 ML: 350 INJECTION, SOLUTION INTRAVENOUS at 11:11

## 2020-11-18 RX ADMIN — DIPHENHYDRAMINE HYDROCHLORIDE 25 MG: 25 CAPSULE ORAL at 10:11

## 2020-11-18 RX ADMIN — MUPIROCIN 2 G: 20 OINTMENT TOPICAL at 08:11

## 2020-11-19 VITALS
SYSTOLIC BLOOD PRESSURE: 126 MMHG | TEMPERATURE: 99 F | HEIGHT: 62 IN | WEIGHT: 150 LBS | DIASTOLIC BLOOD PRESSURE: 58 MMHG | OXYGEN SATURATION: 97 % | RESPIRATION RATE: 18 BRPM | BODY MASS INDEX: 27.6 KG/M2 | HEART RATE: 66 BPM

## 2020-11-19 RX ORDER — CLINDAMYCIN HYDROCHLORIDE 150 MG/1
300 CAPSULE ORAL 4 TIMES DAILY
Qty: 56 CAPSULE | Refills: 0 | Status: SHIPPED | OUTPATIENT
Start: 2020-11-19 | End: 2020-11-26

## 2020-11-19 NOTE — ED NOTES
Patient identifiers for Kiki Molina checked and correct.  LOC:  Kiki Molina is awake, alert, and aware of environment with an appropriate affect. She is oriented x 3 and speaking appropriately.  APPEARANCE:  She is clean and well groomed, patient's clothing is properly fastened.  SKIN:  The skin is warm and dry. She has normal skin turgor and moist mucus membranes. Redness and edema to left side of face, patient says she used a new moisturizer.  MUSCULOSKELETAL:  She is moving all extremities well, no obvious deformities noted.    RESPIRATORY:  Airway is open and patent. Respirations are spontaneous and non-labored with normal effort and rate.  CARDIAC:  She has a normal rate. No peripheral edema noted.   NEUROLOGICAL: Facial expression is symmetrical. Hand grasps are equal bilaterally.  touched with finger.  Allergies reported:    Review of patient's allergies indicates:   Allergen Reactions    Percocet [oxycodone-acetaminophen] Rash    Oxycodone Hives    Lamotrigine Rash and Hives     OTHER NOTES:

## 2020-11-20 NOTE — ED PROVIDER NOTES
Encounter Date: 2020       History     Chief Complaint   Patient presents with    Allergic Reaction     rash to right side of face, used skin care product on week ago     HPI     Seen and evaluated.  Presented with chief complaint right-sided facial rash and cellulitis.  She also has some asymmetry to her face.  This is an acute exacerbation.  Reportedly started at these in facial products some.  She denies any alleviating factors.  She notes she has been picking at her face as well.  She has a shin the notes some swelling to the left side of her face.  This is an acute exacerbation.  Symptoms of moderate.  She denies complaints    Review of patient's allergies indicates:   Allergen Reactions    Percocet [oxycodone-acetaminophen] Rash    Oxycodone Hives    Lamotrigine Rash and Hives     Past Medical History:   Diagnosis Date    Bursitis, trochanteric     Diffuse arthralgia     Fibromyalgia     Heavy tobacco smoker     Pain in lower back     Pinched nerve in neck     Plantar fasciitis, bilateral     PTSD (post-traumatic stress disorder)     PTSD (post-traumatic stress disorder)     PTSD (post-traumatic stress disorder)     Seizures      Past Surgical History:   Procedure Laterality Date     SECTION  2011    FOOT SURGERY Right     2    LAPAROTOMY, EXPLORATORY      OOPHORECTOMY       Family History   Problem Relation Age of Onset    No Known Problems Mother     Seizures Father      Social History     Tobacco Use    Smoking status: Current Every Day Smoker    Smokeless tobacco: Never Used   Substance Use Topics    Alcohol use: No    Drug use: No     Review of Systems   Constitutional: Negative for fever.   HENT: Negative for sore throat.    Respiratory: Negative for shortness of breath.    Cardiovascular: Negative for chest pain.   Gastrointestinal: Negative for nausea.   Musculoskeletal: Negative for back pain.   Skin: Positive for color change and wound. Negative for rash.   All  other systems reviewed and are negative.      Physical Exam     Initial Vitals [11/18/20 1945]   BP Pulse Resp Temp SpO2   120/71 91 16 98.5 °F (36.9 °C) 98 %      MAP       --         Physical Exam    Nursing note and vitals reviewed.  Constitutional: She appears well-developed and well-nourished.   HENT:   Head: Normocephalic.   Facial cellulitis   Eyes: Conjunctivae are normal.   Cardiovascular: Normal rate and regular rhythm.   Abdominal: Soft. Normal appearance.   Musculoskeletal: Normal range of motion.   Neurological: She is alert and oriented to person, place, and time.   Skin: Skin is warm and dry.   Skin rash consistent with impetigo across left side of her face   Psychiatric: She has a normal mood and affect. Her speech is normal.         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  Coronal and sagittal reformat.  This exam was performed according to our departmental dose-optimization program, which includes automated exposure control, adjustment of the mA and/or kV according to patient size and/or use of iterative reconstruction technique.[facility]: Dose Right [facility]: CareDose   [facility]: Dose Right    [facility]: Teradose 4D    [facility]: Smart Technologies    LIMITATIONS:  None    Findings:  Mild asymmetric left-sided parotid enlargement and small bilateral intraparotid nodularity/lymph nodes.  Moderate diffuse left buccal subcutaneous and deep fascial soft tissue edema/cellulitis abutting the anterior aspect of the left parotid gland. Moderate cervical and submandibular  lymphadenopathy, left more than the right.. No drainable fluid collection. No abscess.  Facial bones including orbits, nasal bone, paranasal sinuses, and pterygoid plates appear otherwise intact. Unremarkable partially visualized inferior cranium, temporal bone, and upper neck.    IMPRESSION:  Inflamed left parotid, bilateral parotid nodularity, and moderate left buccal soft tissue edema. Moderate bilateral lymphadenopathy. Differential diagnosis includes acute parotitis/cellulitis, benign lymphoepithelial lesions, and/or other neoplasm. Consider gadolinium-enhanced MRI surveillance and ENT consultation.    Electronically signed by:  Dk Bass MD  11/18/2020 11:55 PM [facility] Workstation: [id number]                               Medical Decision Making:   Initial Assessment:   Seen and evaluated.  Here for skin rash.  Otherwise well-appearing.  Does have swelling to her face.  Consistent with parotitis.  Also consistent with facial cellulitis.  Cover broadly with antibiotics.  She appears stable for discharge, and will need follow-up.  Discussed the outpatient ENT who has agreed to see her in clinic.  Discharged in fair condition                             Clinical Impression:       ICD-10-CM ICD-9-CM   1. Impetigo  L01.00 684   2. Facial cellulitis  L03.211 682.0   3. Parotitis  K11.20 527.2                          ED Disposition Condition    Discharge Stable        ED Prescriptions     Medication Sig Dispense Start Date End Date Auth. Provider    clindamycin (CLEOCIN) 150 MG capsule Take 2 capsules (300 mg total) by mouth 4 (four) times daily. for 7 days 56 capsule 11/19/2020 11/26/2020 Alex Mckeon Jr., MD    mupirocin calcium 2% nasl oint (BACTROBAN) 2 % Oint by Nasal route 2 (two) times daily. for 5 days 10 g 11/19/2020 11/24/2020 Alex Mckeon Jr., MD        Follow-up Information     Follow up With Specialties Details Why Contact Info Additional Information    [facility] Emergency Medicine    [address] [address]  [phone] 1st floor    Cathi Harris, [facility]   [address] [address]  [phone]       Sulaiman Lafleur MD Otolaryngology   [address] [address]  [phone]                                          Alex Mckeon Jr., MD  11/19/20 0958

## 2021-08-09 ENCOUNTER — OFFICE VISIT (OUTPATIENT)
Dept: FAMILY MEDICINE | Facility: CLINIC | Age: 31
End: 2021-08-09

## 2021-08-09 VITALS
WEIGHT: 138 LBS | OXYGEN SATURATION: 98 % | HEART RATE: 92 BPM | HEIGHT: 59 IN | BODY MASS INDEX: 27.82 KG/M2 | DIASTOLIC BLOOD PRESSURE: 82 MMHG | SYSTOLIC BLOOD PRESSURE: 114 MMHG | RESPIRATION RATE: 18 BRPM

## 2021-08-09 DIAGNOSIS — Z13.89 SCREENING FOR SUBSTANCE ABUSE: Primary | ICD-10-CM

## 2021-08-09 DIAGNOSIS — H66.92 LEFT OTITIS MEDIA, UNSPECIFIED OTITIS MEDIA TYPE: ICD-10-CM

## 2021-08-09 PROCEDURE — 99214 OFFICE O/P EST MOD 30 MIN: CPT | Mod: S$PBB,,, | Performed by: NURSE PRACTITIONER

## 2021-08-09 PROCEDURE — 99214 PR OFFICE/OUTPT VISIT, EST, LEVL IV, 30-39 MIN: ICD-10-PCS | Mod: S$PBB,,, | Performed by: NURSE PRACTITIONER

## 2021-08-09 PROCEDURE — 99214 OFFICE O/P EST MOD 30 MIN: CPT | Performed by: NURSE PRACTITIONER

## 2021-08-09 RX ORDER — ARIPIPRAZOLE 2 MG/1
2 TABLET ORAL NIGHTLY
COMMUNITY
Start: 2021-07-17

## 2021-08-09 RX ORDER — AMOXICILLIN 875 MG/1
875 TABLET, FILM COATED ORAL EVERY 12 HOURS
Qty: 10 TABLET | Refills: 0 | Status: SHIPPED | OUTPATIENT
Start: 2021-08-09 | End: 2021-08-14

## 2021-08-09 RX ORDER — TOPIRAMATE 50 MG/1
50 TABLET, FILM COATED ORAL
COMMUNITY
Start: 2021-06-02 | End: 2022-06-02

## 2022-02-09 ENCOUNTER — CLINICAL SUPPORT (OUTPATIENT)
Dept: URGENT CARE | Facility: CLINIC | Age: 32
End: 2022-02-09
Payer: MEDICAID

## 2022-02-09 ENCOUNTER — OFFICE VISIT (OUTPATIENT)
Dept: URGENT CARE | Facility: CLINIC | Age: 32
End: 2022-02-09
Payer: MEDICAID

## 2022-02-09 VITALS
TEMPERATURE: 98 F | DIASTOLIC BLOOD PRESSURE: 68 MMHG | HEART RATE: 98 BPM | SYSTOLIC BLOOD PRESSURE: 103 MMHG | HEIGHT: 59 IN | WEIGHT: 123 LBS | OXYGEN SATURATION: 98 % | RESPIRATION RATE: 18 BRPM | BODY MASS INDEX: 24.8 KG/M2

## 2022-02-09 DIAGNOSIS — H92.09 OTALGIA, UNSPECIFIED LATERALITY: Primary | ICD-10-CM

## 2022-02-09 PROCEDURE — 1159F MED LIST DOCD IN RCRD: CPT | Mod: CPTII,S$GLB,, | Performed by: NURSE PRACTITIONER

## 2022-02-09 PROCEDURE — 3078F PR MOST RECENT DIASTOLIC BLOOD PRESSURE < 80 MM HG: ICD-10-PCS | Mod: CPTII,S$GLB,, | Performed by: NURSE PRACTITIONER

## 2022-02-09 PROCEDURE — 3078F DIAST BP <80 MM HG: CPT | Mod: CPTII,S$GLB,, | Performed by: NURSE PRACTITIONER

## 2022-02-09 PROCEDURE — 99499 NO LOS: ICD-10-PCS | Mod: S$GLB,,, | Performed by: NURSE PRACTITIONER

## 2022-02-09 PROCEDURE — 3008F BODY MASS INDEX DOCD: CPT | Mod: CPTII,S$GLB,, | Performed by: NURSE PRACTITIONER

## 2022-02-09 PROCEDURE — 1159F PR MEDICATION LIST DOCUMENTED IN MEDICAL RECORD: ICD-10-PCS | Mod: CPTII,S$GLB,, | Performed by: NURSE PRACTITIONER

## 2022-02-09 PROCEDURE — 3008F PR BODY MASS INDEX (BMI) DOCUMENTED: ICD-10-PCS | Mod: CPTII,S$GLB,, | Performed by: NURSE PRACTITIONER

## 2022-02-09 PROCEDURE — 99499 UNLISTED E&M SERVICE: CPT | Mod: S$GLB,,, | Performed by: EMERGENCY MEDICINE

## 2022-02-09 PROCEDURE — 99499 UNLISTED E&M SERVICE: CPT | Mod: S$GLB,,, | Performed by: NURSE PRACTITIONER

## 2022-02-09 PROCEDURE — 3074F SYST BP LT 130 MM HG: CPT | Mod: CPTII,S$GLB,, | Performed by: NURSE PRACTITIONER

## 2022-02-09 PROCEDURE — 99499 PR PHYSICAL - DOT/CDL: ICD-10-PCS | Mod: S$GLB,,, | Performed by: EMERGENCY MEDICINE

## 2022-02-09 PROCEDURE — 3074F PR MOST RECENT SYSTOLIC BLOOD PRESSURE < 130 MM HG: ICD-10-PCS | Mod: CPTII,S$GLB,, | Performed by: NURSE PRACTITIONER

## 2022-02-09 NOTE — PROGRESS NOTES
"Subjective:       Patient ID: Kiki Molina is a 31 y.o. female.    Vitals:  height is 4' 11" (1.499 m) and weight is 55.8 kg (123 lb). Her oral temperature is 98.4 °F (36.9 °C). Her blood pressure is 103/68 and her pulse is 98. Her respiration is 18 and oxygen saturation is 98%.     Chief Complaint: Otalgia    Patient left prior to being seen stating that she had to go meet her daughter at elementary school for lunch and that she would be back after lunch.  Did not return    Otalgia   There is pain in the right ear. The current episode started in the past 7 days. The problem has been gradually worsening. There has been no fever. Associated symptoms comments: Body aches, chills, fatigue, post nasal drip.       HENT: Positive for ear pain.        Objective:      Physical Exam      Assessment:       1. Otalgia, unspecified laterality          Plan:         Otalgia, unspecified laterality                   "

## 2022-02-24 NOTE — TELEPHONE ENCOUNTER
----- Message from Yin Castellanos sent at 7/1/2019  2:43 PM CDT -----  [facility] ER [facility], ABDOMINAL PAIN/ NEEDS FU VISIT    PH# [phone]    OR [facility] PH# [phone]    
Patient was instructed to see a GYN and get u/s done. Pt has scheduled u/s and will go to GYN.  
done

## 2022-05-14 ENCOUNTER — HOSPITAL ENCOUNTER (EMERGENCY)
Facility: HOSPITAL | Age: 32
Discharge: HOME | End: 2022-05-14
Attending: EMERGENCY MEDICINE
Payer: COMMERCIAL

## 2022-05-14 VITALS
OXYGEN SATURATION: 100 % | HEIGHT: 59 IN | HEART RATE: 62 BPM | TEMPERATURE: 98 F | RESPIRATION RATE: 12 BRPM | DIASTOLIC BLOOD PRESSURE: 55 MMHG | BODY MASS INDEX: 24.8 KG/M2 | SYSTOLIC BLOOD PRESSURE: 96 MMHG | WEIGHT: 123 LBS

## 2022-05-14 DIAGNOSIS — N39.0 URINARY TRACT INFECTION WITHOUT HEMATURIA, SITE UNSPECIFIED: ICD-10-CM

## 2022-05-14 DIAGNOSIS — G40.909 SEIZURE DISORDER: Primary | ICD-10-CM

## 2022-05-14 DIAGNOSIS — F19.10 POLYSUBSTANCE ABUSE: ICD-10-CM

## 2022-05-14 LAB
ALBUMIN SERPL BCP-MCNC: 4.3 G/DL (ref 3.5–5.2)
ALP SERPL-CCNC: 55 U/L (ref 55–135)
ALT SERPL W/O P-5'-P-CCNC: 27 U/L (ref 10–44)
AMPHET+METHAMPHET UR QL: ABNORMAL
ANION GAP SERPL CALC-SCNC: 12 MMOL/L (ref 8–16)
AST SERPL-CCNC: 38 U/L (ref 10–40)
B-HCG UR QL: NEGATIVE
BACTERIA #/AREA URNS HPF: ABNORMAL /HPF
BARBITURATES UR QL SCN>200 NG/ML: NEGATIVE
BASOPHILS # BLD AUTO: 0.05 K/UL (ref 0–0.2)
BASOPHILS NFR BLD: 0.4 % (ref 0–1.9)
BENZODIAZ UR QL SCN>200 NG/ML: NEGATIVE
BILIRUB SERPL-MCNC: 0.4 MG/DL (ref 0.1–1)
BILIRUB UR QL STRIP: NEGATIVE
BUN SERPL-MCNC: 13 MG/DL (ref 6–20)
BZE UR QL SCN: ABNORMAL
CALCIUM SERPL-MCNC: 8.8 MG/DL (ref 8.7–10.5)
CANNABINOIDS UR QL SCN: NEGATIVE
CHLORIDE SERPL-SCNC: 105 MMOL/L (ref 95–110)
CLARITY UR: ABNORMAL
CO2 SERPL-SCNC: 19 MMOL/L (ref 23–29)
COLOR UR: YELLOW
CREAT SERPL-MCNC: 1.2 MG/DL (ref 0.5–1.4)
CREAT UR-MCNC: 165 MG/DL (ref 15–325)
CTP QC/QA: YES
DIFFERENTIAL METHOD: ABNORMAL
EOSINOPHIL # BLD AUTO: 0.4 K/UL (ref 0–0.5)
EOSINOPHIL NFR BLD: 3 % (ref 0–8)
ERYTHROCYTE [DISTWIDTH] IN BLOOD BY AUTOMATED COUNT: 11.8 % (ref 11.5–14.5)
EST. GFR  (AFRICAN AMERICAN): >60 ML/MIN/1.73 M^2
EST. GFR  (NON AFRICAN AMERICAN): >60 ML/MIN/1.73 M^2
ETHANOL SERPL-MCNC: <5 MG/DL
GLUCOSE SERPL-MCNC: 234 MG/DL (ref 70–110)
GLUCOSE UR QL STRIP: NEGATIVE
HCT VFR BLD AUTO: 44.1 % (ref 37–48.5)
HGB BLD-MCNC: 14.6 G/DL (ref 12–16)
HGB UR QL STRIP: ABNORMAL
HYALINE CASTS #/AREA URNS LPF: 8 /LPF
IMM GRANULOCYTES # BLD AUTO: 0.05 K/UL (ref 0–0.04)
IMM GRANULOCYTES NFR BLD AUTO: 0.4 % (ref 0–0.5)
KETONES UR QL STRIP: NEGATIVE
LEUKOCYTE ESTERASE UR QL STRIP: ABNORMAL
LYMPHOCYTES # BLD AUTO: 5.1 K/UL (ref 1–4.8)
LYMPHOCYTES NFR BLD: 40.3 % (ref 18–48)
MCH RBC QN AUTO: 32.1 PG (ref 27–31)
MCHC RBC AUTO-ENTMCNC: 33.1 G/DL (ref 32–36)
MCV RBC AUTO: 97 FL (ref 82–98)
MICROSCOPIC COMMENT: ABNORMAL
MONOCYTES # BLD AUTO: 1.1 K/UL (ref 0.3–1)
MONOCYTES NFR BLD: 8.7 % (ref 4–15)
NEUTROPHILS # BLD AUTO: 6 K/UL (ref 1.8–7.7)
NEUTROPHILS NFR BLD: 47.2 % (ref 38–73)
NITRITE UR QL STRIP: POSITIVE
NRBC BLD-RTO: 0 /100 WBC
OPIATES UR QL SCN: ABNORMAL
PCP UR QL SCN>25 NG/ML: NEGATIVE
PH UR STRIP: 6 [PH] (ref 5–8)
PLATELET # BLD AUTO: 395 K/UL (ref 150–450)
PMV BLD AUTO: 9.8 FL (ref 9.2–12.9)
POTASSIUM SERPL-SCNC: 3.4 MMOL/L (ref 3.5–5.1)
PROT SERPL-MCNC: 7.2 G/DL (ref 6–8.4)
PROT UR QL STRIP: ABNORMAL
RBC # BLD AUTO: 4.55 M/UL (ref 4–5.4)
RBC #/AREA URNS HPF: 18 /HPF (ref 0–4)
SODIUM SERPL-SCNC: 136 MMOL/L (ref 136–145)
SP GR UR STRIP: 1.02 (ref 1–1.03)
SQUAMOUS #/AREA URNS HPF: 0 /HPF
TOXICOLOGY INFORMATION: ABNORMAL
URN SPEC COLLECT METH UR: ABNORMAL
UROBILINOGEN UR STRIP-ACNC: NEGATIVE EU/DL
WBC # BLD AUTO: 12.69 K/UL (ref 3.9–12.7)
WBC #/AREA URNS HPF: 7 /HPF (ref 0–5)

## 2022-05-14 PROCEDURE — 96361 HYDRATE IV INFUSION ADD-ON: CPT

## 2022-05-14 PROCEDURE — 99284 EMERGENCY DEPT VISIT MOD MDM: CPT | Mod: 25

## 2022-05-14 PROCEDURE — 25000003 PHARM REV CODE 250: Performed by: EMERGENCY MEDICINE

## 2022-05-14 PROCEDURE — 80177 DRUG SCRN QUAN LEVETIRACETAM: CPT | Performed by: EMERGENCY MEDICINE

## 2022-05-14 PROCEDURE — 81001 URINALYSIS AUTO W/SCOPE: CPT | Mod: 91 | Performed by: EMERGENCY MEDICINE

## 2022-05-14 PROCEDURE — 80053 COMPREHEN METABOLIC PANEL: CPT | Performed by: EMERGENCY MEDICINE

## 2022-05-14 PROCEDURE — 81025 URINE PREGNANCY TEST: CPT | Performed by: EMERGENCY MEDICINE

## 2022-05-14 PROCEDURE — 96365 THER/PROPH/DIAG IV INF INIT: CPT

## 2022-05-14 PROCEDURE — 96367 TX/PROPH/DG ADDL SEQ IV INF: CPT

## 2022-05-14 PROCEDURE — 63600175 PHARM REV CODE 636 W HCPCS: Performed by: EMERGENCY MEDICINE

## 2022-05-14 PROCEDURE — 93005 ELECTROCARDIOGRAM TRACING: CPT | Performed by: GENERAL PRACTICE

## 2022-05-14 PROCEDURE — 82077 ASSAY SPEC XCP UR&BREATH IA: CPT | Performed by: EMERGENCY MEDICINE

## 2022-05-14 PROCEDURE — 93010 ELECTROCARDIOGRAM REPORT: CPT | Mod: ,,, | Performed by: GENERAL PRACTICE

## 2022-05-14 PROCEDURE — 93010 EKG 12-LEAD: ICD-10-PCS | Mod: ,,, | Performed by: GENERAL PRACTICE

## 2022-05-14 PROCEDURE — 85025 COMPLETE CBC W/AUTO DIFF WBC: CPT | Performed by: EMERGENCY MEDICINE

## 2022-05-14 PROCEDURE — 80307 DRUG TEST PRSMV CHEM ANLYZR: CPT | Performed by: EMERGENCY MEDICINE

## 2022-05-14 RX ORDER — LEVETIRACETAM 10 MG/ML
1000 INJECTION INTRAVASCULAR
Status: DISCONTINUED | OUTPATIENT
Start: 2022-05-14 | End: 2022-05-14

## 2022-05-14 RX ORDER — SODIUM CHLORIDE 9 MG/ML
1000 INJECTION, SOLUTION INTRAVENOUS
Status: COMPLETED | OUTPATIENT
Start: 2022-05-14 | End: 2022-05-14

## 2022-05-14 RX ORDER — TOPIRAMATE 100 MG/1
100 TABLET, FILM COATED ORAL 2 TIMES DAILY
COMMUNITY
Start: 2022-04-11

## 2022-05-14 RX ORDER — CEPHALEXIN 500 MG/1
500 CAPSULE ORAL EVERY 6 HOURS
Qty: 20 CAPSULE | Refills: 0 | Status: SHIPPED | OUTPATIENT
Start: 2022-05-14 | End: 2022-05-21

## 2022-05-14 RX ADMIN — LEVETIRACETAM 2000 MG: 100 INJECTION, SOLUTION INTRAVENOUS at 07:05

## 2022-05-14 RX ADMIN — CEFTRIAXONE 1 G: 1 INJECTION, SOLUTION INTRAVENOUS at 12:05

## 2022-05-14 RX ADMIN — SODIUM CHLORIDE 1000 ML: 0.9 INJECTION, SOLUTION INTRAVENOUS at 10:05

## 2022-05-14 RX ADMIN — SODIUM CHLORIDE 1000 ML: 0.9 INJECTION, SOLUTION INTRAVENOUS at 05:05

## 2022-05-14 NOTE — ED PROVIDER NOTES
Encounter Date: 2022       History     Chief Complaint   Patient presents with    Seizures     Pt presents to ED with current onset of seizure in progress upon arrival.      This is a 31-year-old female with history of seizure disorder on Keppra but poorly compliant with her her friend who brought her to the ER comes in after a witnessed seizure.  Her friend reports that she was sitting on the floor unpacking some boxes when she had a tonic clonic seizure.  She did not strike her head.  She has been lethargic since.  This happened shortly prior to arrival.  He reports that he thinks her last seizure was a few weeks ago.  He also reports that he does not think she has been taking her Keppra.  He denies any history of ingestion son reports that she is on a pre-trial release drug supervision program.  Patient is pretty lethargic and unable to provide any history.  HPI is limited secondary to that.        Review of patient's allergies indicates:   Allergen Reactions    Percocet [oxycodone-acetaminophen] Rash    Oxycodone Hives    Lamotrigine Rash and Hives     Past Medical History:   Diagnosis Date    Bursitis, trochanteric     Diffuse arthralgia     Fibromyalgia     Heavy tobacco smoker     Pain in lower back     Pinched nerve in neck     Plantar fasciitis, bilateral     PTSD (post-traumatic stress disorder)     PTSD (post-traumatic stress disorder)     PTSD (post-traumatic stress disorder)     Seizures      Past Surgical History:   Procedure Laterality Date     SECTION  2011    FOOT SURGERY Right     2    LAPAROTOMY, EXPLORATORY      OOPHORECTOMY       Family History   Problem Relation Age of Onset    No Known Problems Mother     Seizures Father      Social History     Tobacco Use    Smoking status: Current Every Day Smoker     Packs/day: 1.00     Types: Cigarettes    Smokeless tobacco: Never Used   Substance Use Topics    Alcohol use: No    Drug use: No     Review of Systems    Unable to perform ROS: Mental status change       Physical Exam     Initial Vitals [05/14/22 0519]   BP Pulse Resp Temp SpO2   138/84 (!) 116 18 97.8 °F (36.6 °C) (!) 90 %      MAP       --         Physical Exam    Constitutional: Vital signs are normal. She appears well-developed and well-nourished.  Non-toxic appearance. No distress.   HENT:   Head: Normocephalic and atraumatic.   Mouth/Throat: Oropharynx is clear and moist.   Eyes: Conjunctivae and EOM are normal. Pupils are equal, round, and reactive to light.   Neck: Neck supple.   Normal range of motion.  Cardiovascular: Normal rate, regular rhythm and intact distal pulses.   Pulmonary/Chest: Breath sounds normal. She has no wheezes.   Abdominal: Abdomen is soft. Bowel sounds are normal. There is no abdominal tenderness.   Musculoskeletal:         General: Normal range of motion.      Cervical back: Normal range of motion and neck supple. No rigidity. No muscular tenderness.     Lymphadenopathy:     She has no cervical adenopathy.     She has no axillary adenopathy.   Neurological: Gait normal.   Lethargic but arousable to painful stimuli, cannot cooperate with full neuro exam but moving all extremities.   Skin: Skin is warm, dry and intact.   Psychiatric:   Unable to evaluate secondary to mentation         ED Course   Procedures  Labs Reviewed   CBC W/ AUTO DIFFERENTIAL - Abnormal; Notable for the following components:       Result Value    MCH 32.1 (*)     Immature Grans (Abs) 0.05 (*)     Lymph # 5.1 (*)     Mono # 1.1 (*)     All other components within normal limits   COMPREHENSIVE METABOLIC PANEL - Abnormal; Notable for the following components:    Potassium 3.4 (*)     CO2 19 (*)     Glucose 234 (*)     All other components within normal limits   DRUG SCREEN PANEL, URINE EMERGENCY - Abnormal; Notable for the following components:    Cocaine (Metab.) Presumptive Positive (*)     Opiate Scrn, Ur Presumptive Positive (*)     Amphetamine Screen, Ur  Presumptive Positive (*)     All other components within normal limits    Narrative:     Specimen Source->Urine   URINALYSIS, REFLEX TO URINE CULTURE - Abnormal; Notable for the following components:    Appearance, UA Hazy (*)     Protein, UA Trace (*)     Occult Blood UA 3+ (*)     Nitrite, UA Positive (*)     Leukocytes, UA Trace (*)     All other components within normal limits    Narrative:     Specimen Source->Urine   URINALYSIS MICROSCOPIC - Abnormal; Notable for the following components:    RBC, UA 18 (*)     WBC, UA 7 (*)     Bacteria Moderate (*)     Hyaline Casts, UA 8 (*)     All other components within normal limits    Narrative:     Specimen Source->Urine   ALCOHOL,MEDICAL (ETHANOL)   LEVETIRACETAM  (KEPPRA) LEVEL   POCT URINE PREGNANCY     EKG Readings: (Independently Interpreted)   Time: 0527  Rate: 108 bpm  Sinus tachycardia  No ST or T wave abnormalities  No priors     ECG Results          EKG 12-lead (In process)  Result time 05/14/22 10:23:55    In process by Interface, Lab In [facility] (05/14/22 10:23:55)                 Narrative:    Test Reason : R56.9,    Vent. Rate : 108 BPM     Atrial Rate : 108 BPM     P-R Int : 138 ms          QRS Dur : 086 ms      QT Int : 354 ms       P-R-T Axes : 078 086 062 degrees     QTc Int : 474 ms    Sinus tachycardia  Otherwise normal ECG  When compared with ECG of 02-OCT-2018 10:19,  No significant change was found    Referred By: [email or web address]   SELF           Confirmed By:                             Imaging Results    None          Medications   cefTRIAXone (ROCEPHIN) 1 g/50 mL D5W IVPB (has no administration in time range)   0.9%  NaCl infusion (0 mLs Intravenous Stopped 5/14/22 0643)   levETIRAcetam (KEPPRA) 2,000 mg in dextrose 5 % 250 mL IVPB (0 mg Intravenous Stopped 5/14/22 0749)   0.9%  NaCl infusion (0 mLs Intravenous Stopped 5/14/22 1054)     Medical Decision Making:   Initial Assessment:   This is a 31-year-old female with history of seizure disorder  brought in secondary to a witnessed seizure.  On evaluation she was initially hypoxic and tachycardic.  She is fairly lethargic but arousable to painful stimuli.  She has no evidence of trauma.  She has no evidence of any focal motor deficits.    Orders included EKG, CBC, CMP, UDS, UPT, Keppra level, blood alcohol level.  She was hydrated.  She was placed on supplemental oxygen.  Differential Diagnosis:   Ingestion, intoxication, breakthrough seizure, medication noncompliance, arrhythmia, closed head injury.  Other:   I have discussed this case with another health care provider.       <> Summary of the Discussion: 0600  Patient was turned over to oncoming daytime staff pending results of her labs and final disposition.            Attending Attestation:             Attending ED Notes:   This 31-year-old female who had been seen initially managed by doctors aura and whose care assumed at 6:10 a.m. this morning with history she had had a generalized seizure.  Patient was carried to the emergency room by her boyfriend.  She reported that she had not taken her Keppra in at least 3 days.  She is also on Topamax and is unclear as to whether not she had taken at.  Patient did admit later that she has not run out of her medications but simply did taken.  The screening labs showed she had evidence of UTI was started on Rocephin in the ED.  She will be continued on antibiotics as an outpatient.  Her urine drug screen was positive for cocaine opiates and amphetamines.  She was observed emergency room for several hours and eventually did awaken and was appropriately responsive.  She will be able to be discharged to a responsible adult but advised to refrain from her continue drug use.  She will be given a prescription for Keflex for her UTI.      ED Course as of 05/14/22 1237   Sat May 14, 2022   0606 Patient was turned over to Dr. Moreno [TZ]      ED Course User Index  [TZ] Wendy Salazar MD             Clinical Impression:    Final diagnoses:  [G40.909] Seizure disorder (Primary)  [F19.10] Polysubstance abuse  [N39.0] Urinary tract infection without hematuria, site unspecified          ED Disposition Condition    Discharge Stable        ED Prescriptions     Medication Sig Dispense Start Date End Date Auth. Provider    cephALEXin (KEFLEX) 500 MG capsule Take 1 capsule (500 mg total) by mouth every 6 (six) hours. for 7 days 20 capsule 5/14/2022 5/21/2022 Jeronimo Moreno Jr., MD        Follow-up Information     Follow up With Specialties Details Why Contact Info    Nickolas Dowd NP Family Medicine In 3 days For recheck [address]  [address]  [place] [address]  [phone]             Jeronimo Moreno Jr., MD  05/14/22 1222       Jeronimo Moreno Jr., MD  05/14/22 1236

## 2022-05-14 NOTE — ED NOTES
Attempted to call fiance back to give update as consented by patient.  There was no answer.  Left my name and message to call back.

## 2022-05-14 NOTE — DISCHARGE INSTRUCTIONS
Restart her Keppra 1500 mg twice daily.  Also restart Topamax.  Stop drug use.  Follow-up with your primary care provider.  Take Keflex 4 times daily for urinary tract infection

## 2022-05-14 NOTE — ED NOTES
"Pt more awake/alert. Advised she does not need to urinate, refuses in/out cath stating " I do not consent to that I do not have an infection nor am I pregnant." no other complaints/requests. Vss. Remains in nad with significant other at bedside.   "

## 2022-05-19 LAB — LEVETIRACETAM SERPL-MCNC: <1 UG/ML (ref 10–40)

## 2022-08-31 ENCOUNTER — OFFICE VISIT (OUTPATIENT)
Dept: URGENT CARE | Facility: CLINIC | Age: 32
End: 2022-08-31
Payer: MEDICAID

## 2022-08-31 VITALS
SYSTOLIC BLOOD PRESSURE: 101 MMHG | DIASTOLIC BLOOD PRESSURE: 66 MMHG | WEIGHT: 164 LBS | BODY MASS INDEX: 28 KG/M2 | OXYGEN SATURATION: 98 % | TEMPERATURE: 98 F | HEIGHT: 64 IN | HEART RATE: 93 BPM | RESPIRATION RATE: 16 BRPM

## 2022-08-31 DIAGNOSIS — J02.9 SORE THROAT: ICD-10-CM

## 2022-08-31 DIAGNOSIS — J06.9 VIRAL URI WITH COUGH: Primary | ICD-10-CM

## 2022-08-31 DIAGNOSIS — Z20.822 COVID-19 VIRUS NOT DETECTED: ICD-10-CM

## 2022-08-31 LAB
CTP QC/QA: YES
CTP QC/QA: YES
S PYO RRNA THROAT QL PROBE: NEGATIVE
SARS-COV-2 AG RESP QL IA.RAPID: NEGATIVE

## 2022-08-31 PROCEDURE — 87880 POCT RAPID STREP A: ICD-10-PCS | Mod: QW,,, | Performed by: NURSE PRACTITIONER

## 2022-08-31 PROCEDURE — 1159F MED LIST DOCD IN RCRD: CPT | Mod: CPTII,S$GLB,, | Performed by: NURSE PRACTITIONER

## 2022-08-31 PROCEDURE — 3078F DIAST BP <80 MM HG: CPT | Mod: CPTII,S$GLB,, | Performed by: NURSE PRACTITIONER

## 2022-08-31 PROCEDURE — 87811 SARS CORONAVIRUS 2 ANTIGEN POCT, MANUAL READ: ICD-10-PCS | Mod: QW,S$GLB,, | Performed by: NURSE PRACTITIONER

## 2022-08-31 PROCEDURE — 1160F PR REVIEW ALL MEDS BY PRESCRIBER/CLIN PHARMACIST DOCUMENTED: ICD-10-PCS | Mod: CPTII,S$GLB,, | Performed by: NURSE PRACTITIONER

## 2022-08-31 PROCEDURE — 3078F PR MOST RECENT DIASTOLIC BLOOD PRESSURE < 80 MM HG: ICD-10-PCS | Mod: CPTII,S$GLB,, | Performed by: NURSE PRACTITIONER

## 2022-08-31 PROCEDURE — 99214 OFFICE O/P EST MOD 30 MIN: CPT | Mod: S$GLB,,, | Performed by: NURSE PRACTITIONER

## 2022-08-31 PROCEDURE — 1159F PR MEDICATION LIST DOCUMENTED IN MEDICAL RECORD: ICD-10-PCS | Mod: CPTII,S$GLB,, | Performed by: NURSE PRACTITIONER

## 2022-08-31 PROCEDURE — 3074F SYST BP LT 130 MM HG: CPT | Mod: CPTII,S$GLB,, | Performed by: NURSE PRACTITIONER

## 2022-08-31 PROCEDURE — 3074F PR MOST RECENT SYSTOLIC BLOOD PRESSURE < 130 MM HG: ICD-10-PCS | Mod: CPTII,S$GLB,, | Performed by: NURSE PRACTITIONER

## 2022-08-31 PROCEDURE — 99214 PR OFFICE/OUTPT VISIT, EST, LEVL IV, 30-39 MIN: ICD-10-PCS | Mod: S$GLB,,, | Performed by: NURSE PRACTITIONER

## 2022-08-31 PROCEDURE — 1160F RVW MEDS BY RX/DR IN RCRD: CPT | Mod: CPTII,S$GLB,, | Performed by: NURSE PRACTITIONER

## 2022-08-31 PROCEDURE — 3008F PR BODY MASS INDEX (BMI) DOCUMENTED: ICD-10-PCS | Mod: CPTII,S$GLB,, | Performed by: NURSE PRACTITIONER

## 2022-08-31 PROCEDURE — 3008F BODY MASS INDEX DOCD: CPT | Mod: CPTII,S$GLB,, | Performed by: NURSE PRACTITIONER

## 2022-08-31 PROCEDURE — 87811 SARS-COV-2 COVID19 W/OPTIC: CPT | Mod: QW,S$GLB,, | Performed by: NURSE PRACTITIONER

## 2022-08-31 PROCEDURE — 87880 STREP A ASSAY W/OPTIC: CPT | Mod: QW,,, | Performed by: NURSE PRACTITIONER

## 2022-08-31 RX ORDER — CETIRIZINE HYDROCHLORIDE 10 MG/1
10 TABLET ORAL DAILY PRN
Qty: 7 TABLET | Refills: 0 | Status: SHIPPED | OUTPATIENT
Start: 2022-08-31 | End: 2022-09-07

## 2022-08-31 RX ORDER — FLUTICASONE PROPIONATE 50 MCG
2 SPRAY, SUSPENSION (ML) NASAL DAILY PRN
Qty: 15.8 ML | Refills: 0 | Status: SHIPPED | OUTPATIENT
Start: 2022-08-31 | End: 2022-09-14

## 2022-08-31 RX ORDER — BENZONATATE 100 MG/1
100 CAPSULE ORAL 3 TIMES DAILY PRN
Qty: 21 CAPSULE | Refills: 0 | Status: SHIPPED | OUTPATIENT
Start: 2022-08-31 | End: 2022-09-07

## 2022-08-31 NOTE — PROGRESS NOTES
"Subjective:       Patient ID: Kiki Molina is a 32 y.o. female.    Vitals:  height is 5' 4" (1.626 m) and weight is 74.4 kg (164 lb). Her temperature is 98.2 °F (36.8 °C). Her blood pressure is 101/66 and her pulse is 93. Her respiration is 16 and oxygen saturation is 98%.     Chief Complaint: Sore Throat    Pt states " scratchy throat body aches fatigue. Started yesterday."    Constitution: Positive for fatigue. Negative for chills and fever.   HENT:  Positive for sore throat. Negative for congestion, trouble swallowing and voice change.    Neck: Negative for painful lymph nodes.   Cardiovascular:  Negative for chest pain, palpitations and sob on exertion.   Respiratory:  Positive for cough and sputum production. Negative for shortness of breath.    Gastrointestinal:  Negative for abdominal pain, nausea, vomiting and diarrhea.   Musculoskeletal:  Positive for muscle ache. Negative for joint pain.   Skin:  Negative for rash.   Neurological:  Negative for dizziness, light-headedness, passing out, disorientation and altered mental status.   Hematologic/Lymphatic: Negative for swollen lymph nodes.   Psychiatric/Behavioral:  Negative for altered mental status, disorientation and confusion.      Objective:      Physical Exam   Constitutional: She is oriented to person, place, and time. She appears well-developed. She is cooperative.  Non-toxic appearance. She does not appear ill. No distress.   HENT:   Head: Normocephalic and atraumatic.   Ears:   Right Ear: Hearing, tympanic membrane, external ear and ear canal normal.   Left Ear: Hearing, external ear and ear canal normal. Tympanic membrane is scarred.   Nose: Rhinorrhea present. No mucosal edema, purulent discharge, sinus tenderness, nasal deformity or congestion. No epistaxis. Right sinus exhibits frontal sinus tenderness. Right sinus exhibits no maxillary sinus tenderness. Left sinus exhibits frontal sinus tenderness. Left sinus exhibits no maxillary sinus " tenderness.   Mouth/Throat: Uvula is midline and mucous membranes are normal. Mucous membranes are moist. No trismus in the jaw. Normal dentition. No uvula swelling. Posterior oropharyngeal erythema present. No oropharyngeal exudate, posterior oropharyngeal edema, tonsillar abscesses or cobblestoning. Tonsils are 1+ on the right. Tonsils are 1+ on the left. No tonsillar exudate.   Eyes: Conjunctivae and lids are normal. No scleral icterus.   Neck: Trachea normal and phonation normal. Neck supple. No edema present. No erythema present. No neck rigidity present.   Cardiovascular: Normal rate, regular rhythm, normal heart sounds and normal pulses.   Pulmonary/Chest: Effort normal and breath sounds normal. No respiratory distress. She has no decreased breath sounds. She has no rhonchi.   Abdominal: Normal appearance.   Musculoskeletal: Normal range of motion.         General: No deformity. Normal range of motion.   Neurological: She is alert and oriented to person, place, and time. She exhibits normal muscle tone. Coordination normal.   Skin: Skin is warm, dry, intact, not diaphoretic and not pale. Capillary refill takes 2 to 3 seconds.   Psychiatric: Her speech is normal and behavior is normal. Judgment and thought content normal.   Nursing note and vitals reviewed.      Assessment:       1. Viral URI with cough    2. Sore throat    3. COVID-19 virus not detected          Plan:         Viral URI with cough  -     fluticasone propionate (FLONASE) 50 mcg/actuation nasal spray; 2 sprays (100 mcg total) by Each Nostril route daily as needed for Rhinitis.  Dispense: 15.8 mL; Refill: 0  -     cetirizine (ZYRTEC) 10 MG tablet; Take 1 tablet (10 mg total) by mouth daily as needed for Allergies.  Dispense: 7 tablet; Refill: 0  -     benzonatate (TESSALON) 100 MG capsule; Take 1 capsule (100 mg total) by mouth 3 (three) times daily as needed for Cough.  Dispense: 21 capsule; Refill: 0    Sore throat  -     SARS Coronavirus 2  Antigen, POCT Manual Read  -     POCT rapid strep A    COVID-19 virus not detected     Strep POCT negative    I have discussed the test results and physical exam findings with the patient. We discussed symptom monitoring, treatment options, medication use, and follow up orders. She verbalized understanding and agreement with the plan of care.       Increase clear fluid intake  Stop all over the counter cough, cold, flu medicine  Tylenol/motrin otc for fever or pain  Flonase nasal spray, zyrtec, and tessalon perles as needed.   May take over the counter Mucinex ER as needed for chest congestion  Saltwater gargles 4 x daily and OTC anesthetic throat lozenges for sore throat. May also add honey based cough syrup  Follow up with PCP  Return to clinic for new, worse, or unresolving symptoms

## 2022-08-31 NOTE — PATIENT INSTRUCTIONS
Increase clear fluid intake  Stop all over the counter cough, cold, flu medicine  Tylenol/motrin otc for fever or pain  Flonase nasal spray, zyrtec, and tessalon perles as needed.   May take over the counter Mucinex ER as needed for chest congestion  Saltwater gargles 4 x daily and OTC anesthetic throat lozenges for sore throat. May also add honey based cough syrup  Follow up with PCP  Return to clinic for new, worse, or unresolving symptoms

## 2023-05-02 ENCOUNTER — TELEPHONE (OUTPATIENT)
Dept: FAMILY MEDICINE | Facility: CLINIC | Age: 33
End: 2023-05-02

## 2023-05-02 NOTE — TELEPHONE ENCOUNTER
Melissa from [facility]. Called regarding a request for medical records that was sent 3-32-23. The request was sent to O. I called Melissa back receiving voice mail. Left message advising they needed to contact medical records department and left that contact number.